# Patient Record
Sex: FEMALE | Race: ASIAN | NOT HISPANIC OR LATINO | Employment: UNEMPLOYED | URBAN - METROPOLITAN AREA
[De-identification: names, ages, dates, MRNs, and addresses within clinical notes are randomized per-mention and may not be internally consistent; named-entity substitution may affect disease eponyms.]

---

## 2017-08-18 ENCOUNTER — OFFICE VISIT (OUTPATIENT)
Dept: PEDIATRICS | Facility: CLINIC | Age: 47
End: 2017-08-18
Payer: COMMERCIAL

## 2017-08-18 VITALS
OXYGEN SATURATION: 99 % | HEART RATE: 64 BPM | HEIGHT: 65 IN | DIASTOLIC BLOOD PRESSURE: 74 MMHG | WEIGHT: 150 LBS | SYSTOLIC BLOOD PRESSURE: 116 MMHG | BODY MASS INDEX: 24.99 KG/M2 | TEMPERATURE: 98.6 F

## 2017-08-18 DIAGNOSIS — Z83.3 FAMILY HISTORY OF DIABETES MELLITUS: ICD-10-CM

## 2017-08-18 DIAGNOSIS — R00.2 PALPITATIONS: ICD-10-CM

## 2017-08-18 DIAGNOSIS — Z00.00 ROUTINE GENERAL MEDICAL EXAMINATION AT A HEALTH CARE FACILITY: Primary | ICD-10-CM

## 2017-08-18 DIAGNOSIS — Z13.6 CARDIOVASCULAR SCREENING; LDL GOAL LESS THAN 160: ICD-10-CM

## 2017-08-18 DIAGNOSIS — E55.9 VITAMIN D DEFICIENCY: ICD-10-CM

## 2017-08-18 LAB
ANION GAP SERPL CALCULATED.3IONS-SCNC: 8 MMOL/L (ref 3–14)
BUN SERPL-MCNC: 8 MG/DL (ref 7–30)
CALCIUM SERPL-MCNC: 9 MG/DL (ref 8.5–10.1)
CHLORIDE SERPL-SCNC: 107 MMOL/L (ref 94–109)
CHOLEST SERPL-MCNC: 152 MG/DL
CO2 SERPL-SCNC: 25 MMOL/L (ref 20–32)
CREAT SERPL-MCNC: 0.65 MG/DL (ref 0.52–1.04)
ERYTHROCYTE [DISTWIDTH] IN BLOOD BY AUTOMATED COUNT: 12.9 % (ref 10–15)
GFR SERPL CREATININE-BSD FRML MDRD: >90 ML/MIN/1.7M2
GLUCOSE SERPL-MCNC: 89 MG/DL (ref 70–99)
HCT VFR BLD AUTO: 38.7 % (ref 35–47)
HDLC SERPL-MCNC: 38 MG/DL
HGB BLD-MCNC: 12.7 G/DL (ref 11.7–15.7)
LDLC SERPL CALC-MCNC: 86 MG/DL
MCH RBC QN AUTO: 28.9 PG (ref 26.5–33)
MCHC RBC AUTO-ENTMCNC: 32.8 G/DL (ref 31.5–36.5)
MCV RBC AUTO: 88 FL (ref 78–100)
NONHDLC SERPL-MCNC: 114 MG/DL
PLATELET # BLD AUTO: 189 10E9/L (ref 150–450)
POTASSIUM SERPL-SCNC: 4.3 MMOL/L (ref 3.4–5.3)
RBC # BLD AUTO: 4.4 10E12/L (ref 3.8–5.2)
SODIUM SERPL-SCNC: 140 MMOL/L (ref 133–144)
TRIGL SERPL-MCNC: 138 MG/DL
TSH SERPL DL<=0.005 MIU/L-ACNC: 2.38 MU/L (ref 0.4–4)
WBC # BLD AUTO: 6.6 10E9/L (ref 4–11)

## 2017-08-18 PROCEDURE — 80061 LIPID PANEL: CPT | Performed by: INTERNAL MEDICINE

## 2017-08-18 PROCEDURE — 85027 COMPLETE CBC AUTOMATED: CPT | Performed by: INTERNAL MEDICINE

## 2017-08-18 PROCEDURE — 84443 ASSAY THYROID STIM HORMONE: CPT | Performed by: INTERNAL MEDICINE

## 2017-08-18 PROCEDURE — 99396 PREV VISIT EST AGE 40-64: CPT | Performed by: INTERNAL MEDICINE

## 2017-08-18 PROCEDURE — 36415 COLL VENOUS BLD VENIPUNCTURE: CPT | Performed by: INTERNAL MEDICINE

## 2017-08-18 PROCEDURE — 80048 BASIC METABOLIC PNL TOTAL CA: CPT | Performed by: INTERNAL MEDICINE

## 2017-08-18 PROCEDURE — 82306 VITAMIN D 25 HYDROXY: CPT | Performed by: INTERNAL MEDICINE

## 2017-08-18 PROCEDURE — 99213 OFFICE O/P EST LOW 20 MIN: CPT | Mod: 25 | Performed by: INTERNAL MEDICINE

## 2017-08-18 NOTE — PATIENT INSTRUCTIONS
Preventive Health Recommendations  Female Ages 40 to 49    Yearly exam:     See your health care provider every year in order to  1. Review health changes.   2. Discuss preventive care.    3. Review your medicines if your doctor prescribed any.        If you get Pap tests with HPV test, you only need to test every 5 years, unless you have an abnormal result. You are due in 2021      Ask your doctor if you should have a mammogram.      Have a colonoscopy (test for colon cancer) if someone in your family has had colon cancer or polyps before age 50.       Have a cholesterol test every 5 years.       Have a diabetes test (fasting glucose) after age 45. If you are at risk for diabetes, you should have this test every 3 years.    You can consider a mirena for contraception: [email or web address]/about-mirena/.  You can schedule with one of our nurse practitioners (Jasmin Pineda or Neisha Alejandre) if you are interested.    Shots: Get a flu shot each year. Get a tetanus shot every 10 years.     Nutrition:     Eat at least 5 servings of fruits and vegetables each day.    Eat whole-grain bread, whole-wheat pasta and brown rice instead of white grains and rice.    Talk to your provider about Calcium and Vitamin D.     Lifestyle    Exercise at least 150 minutes a week (an average of 30 minutes a day, 5 days a week). This will help you control your weight and prevent disease.    Limit alcohol to one drink per day.    No smoking.     Wear sunscreen to prevent skin cancer.    See your dentist every six months for an exam and cleaning.

## 2017-08-18 NOTE — NURSING NOTE
"Chief Complaint   Patient presents with     Physical       Initial /74 (BP Location: Right arm, Patient Position: Right side, Cuff Size: Adult Regular)  Pulse 64  Temp 98.6  F (37  C) (Tympanic)  Ht 5' 5\" (1.651 m)  Wt 150 lb (68 kg)  SpO2 99%  BMI 24.96 kg/m2 Estimated body mass index is 24.96 kg/(m^2) as calculated from the following:    Height as of this encounter: 5' 5\" (1.651 m).    Weight as of this encounter: 150 lb (68 kg).  Medication Reconciliation: complete  "

## 2017-08-18 NOTE — PROGRESS NOTES
SUBJECTIVE:   CC: Carie Hi is an 46 year old woman who presents for preventive health visit.     Physical   Annual:     Getting at least 3 servings of Calcium per day::  Yes    Bi-annual eye exam::  Yes    Dental care twice a year::  Yes    Sleep apnea or symptoms of sleep apnea::  None    Diet::  Vegetarian/vegan    Frequency of exercise::  2-3 days/week    Duration of exercise::  45-60 minutes    Taking medications regularly::  Not Applicable    Additional concerns today::  YES (palpitations before bed )    Exercise - goes to [facility] 3-4 x/wk with elliptical, stepper and weights.  Not as much in summer, more walks and biking.    Palpitations - only notes when lying down in bed.  Not with exercise and no lightheadedness.  Not sure about exercise.  Has been worse this summer since not exercising.  Had normal EKG in 2016.      Today's PHQ-2 Score:   PHQ-2 ( 1999 [employer]izer) 8/15/2017   Q1: Little interest or pleasure in doing things 0   Q2: Feeling down, depressed or hopeless 0   PHQ-2 Score 0   Q1: Little interest or pleasure in doing things Not at all   Q2: Feeling down, depressed or hopeless Not at all   PHQ-2 Score 0       Abuse: Current or Past(Physical, Sexual or Emotional)- No  Do you feel safe in your environment - Yes    Social History   Substance Use Topics     Smoking status: Never Smoker     Smokeless tobacco: Never Used     Alcohol use No     The patient does not drink >3 drinks per day nor >7 drinks per week.    Reviewed orders with patient.  Reviewed health maintenance and updated orders accordingly - Yes  Labs reviewed in EPIC    Patient under age 50, mutual decision reflected in health maintenance.        Pertinent mammograms are reviewed under the imaging tab.  History of abnormal Pap smear: NO - age 30-65 PAP every 5 years with negative HPV co-testing recommended    Reviewed and updated as needed this visit by clinical staffTobacco  Allergies  Meds  Problems  Med Hx  Surg Hx  Fam Hx  " Soc Hx          Reviewed and updated as needed this visit by Provider  Tobacco  Allergies  Meds  Problems  Fam Hx  Soc Hx           ROS:  C: NEGATIVE for fever, chills, change in weight  I: NEGATIVE for worrisome rashes, moles or lesions  E: NEGATIVE for vision changes or irritation  ENT: NEGATIVE for ear, mouth and throat problems  R: NEGATIVE for significant cough or SOB  B: NEGATIVE for masses, tenderness or discharge  CV: NEGATIVE for chest pain, palpitations or peripheral edema  GI: NEGATIVE for nausea, abdominal pain, heartburn, or change in bowel habits  : NEGATIVE for unusual urinary or vaginal symptoms. Periods are regular, no heavy bleeding.  M: NEGATIVE for significant arthralgias or myalgia  N: NEGATIVE for weakness, dizziness or paresthesias  P: NEGATIVE for changes in mood or affect     OBJECTIVE:   /74 (BP Location: Right arm, Patient Position: Right side, Cuff Size: Adult Regular)  Pulse 64  Temp 98.6  F (37  C) (Tympanic)  Ht 5' 5\" (1.651 m)  Wt 150 lb (68 kg)  SpO2 99%  BMI 24.96 kg/m2  EXAM:  GENERAL: healthy, alert and no distress  EYES: Eyes grossly normal to inspection, PERRL and conjunctivae and sclerae normal  HENT: ear canals and TM's normal, nose and mouth without ulcers or lesions  NECK: no adenopathy, no asymmetry, masses, or scars and thyroid normal to palpation  RESP: lungs clear to auscultation - no rales, rhonchi or wheezes  BREAST: normal without masses, tenderness or nipple discharge and no palpable axillary masses or adenopathy  CV: regular rate and rhythm, normal S1 S2, no S3 or S4, no murmur, click or rub, no peripheral edema and peripheral pulses strong  ABDOMEN: soft, nontender, no hepatosplenomegaly, no masses and bowel sounds normal  MS: no gross musculoskeletal defects noted, no edema  SKIN: no suspicious lesions or rashes  NEURO: Normal strength and tone, mentation intact and speech normal  PSYCH: mentation appears normal, affect " "normal/bright    ASSESSMENT/PLAN:   1. Routine general medical examination at a health care facility  Routine health education discussed: calcium, diet, exercise, weight, safety.     2. CARDIOVASCULAR SCREENING; LDL GOAL LESS THAN 160   - Lipid panel reflex to direct LDL    3. Family history of diabetes mellitus    - Glucose    4. Vitamin D deficiency    - Vitamin D Deficiency      5. Palpitations  Will screen for metabolic cause with labs per orders. Consider holter based on results.  Increase exercise encouraged  - CBC with platelets  - TSH with free T4 reflex  - Basic metabolic panel     COUNSELING:  Reviewed preventive health counseling, as reflected in patient instructions         reports that she has never smoked. She has never used smokeless tobacco.    Estimated body mass index is 24.96 kg/(m^2) as calculated from the following:    Height as of this encounter: 5' 5\" (1.651 m).    Weight as of this encounter: 150 lb (68 kg).         Counseling Resources:  ATP IV Guidelines  Pooled Cohorts Equation Calculator  Breast Cancer Risk Calculator  FRAX Risk Assessment  ICSI Preventive Guidelines  Dietary Guidelines for Americans, 2010  USDA's MyPlate  ASA Prophylaxis  Lung CA Screening    Sun Breen MD  [facility] [place]  "

## 2017-08-18 NOTE — MR AVS SNAPSHOT
After Visit Summary   8/18/2017    Carie Hi    MRN: [id number]           Patient Information     Date Of Birth          1970        Visit Information        Provider Department      8/18/2017 8:50 AM Sun Breen MD [facility] [place]        Today's Diagnoses     Routine general medical examination at a health care facility    -  1    CARDIOVASCULAR SCREENING; LDL GOAL LESS THAN 160        Family history of diabetes mellitus        Vitamin D deficiency        Palpitations          Care Instructions      Preventive Health Recommendations  Female Ages 40 to 49    Yearly exam:     See your health care provider every year in order to  1. Review health changes.   2. Discuss preventive care.    3. Review your medicines if your doctor prescribed any.        If you get Pap tests with HPV test, you only need to test every 5 years, unless you have an abnormal result. You are due in 2021      Ask your doctor if you should have a mammogram.      Have a colonoscopy (test for colon cancer) if someone in your family has had colon cancer or polyps before age 50.       Have a cholesterol test every 5 years.       Have a diabetes test (fasting glucose) after age 45. If you are at risk for diabetes, you should have this test every 3 years.    You can consider a mirena for contraception: [email or web address]/about-[email or web address]  You can schedule with one of our nurse practitioners (Jasmin Pineda or Neisha Alejandre) if you are interested.    Shots: Get a flu shot each year. Get a tetanus shot every 10 years.     Nutrition:     Eat at least 5 servings of fruits and vegetables each day.    Eat whole-grain bread, whole-wheat pasta and brown rice instead of white grains and rice.    Talk to your provider about Calcium and Vitamin D.     Lifestyle    Exercise at least 150 minutes a week (an average of 30 minutes a day, 5 days a week). This will help you control your weight and prevent disease.    Limit  "alcohol to one drink per day.    No smoking.     Wear sunscreen to prevent skin cancer.    See your dentist every six months for an exam and cleaning.          Follow-ups after your visit        Who to contact     If you have questions or need follow up information about today's clinic visit or your schedule please contact [facility] directly at [phone].  Normal or non-critical lab and imaging results will be communicated to you by MyChart, letter or phone within 4 business days after the clinic has received the results. If you do not hear from us within 7 days, please contact the clinic through [employer] or phone. If you have a critical or abnormal lab result, we will notify you by phone as soon as possible.  Submit refill requests through [employer] or call your pharmacy and they will forward the refill request to us. Please allow 3 business days for your refill to be completed.          Additional Information About Your Visit        [employer] Information     [employer] gives you secure access to your electronic health record. If you see a primary care provider, you can also send messages to your care team and make appointments. If you have questions, please call your primary care clinic.  If you do not have a primary care provider, please call [phone] and they will assist you.        Care EveryWhere ID     This is your Care EveryWhere ID. This could be used by other organizations to access your [place] medical records  [id number]        Your Vitals Were     Pulse Temperature Height Pulse Oximetry BMI (Body Mass Index)       64 98.6  F (37  C) (Tympanic) 5' 5\" (1.651 m) 99% 24.96 kg/m2        Blood Pressure from Last 3 Encounters:   08/18/17 116/74   02/16/16 116/80   02/06/16 (!) 146/92    Weight from Last 3 Encounters:   08/18/17 150 lb (68 kg)   02/16/16 144 lb (65.3 kg)   02/06/16 143 lb 8 oz (65.1 kg)              We Performed the Following     Basic metabolic panel     CBC with platelets     " Lipid panel reflex to direct LDL     TSH with free T4 reflex     Vitamin D Deficiency        Primary Care Provider Office Phone # Fax #    Sun Breen [phone]       [facility] [place] [address]        Equal Access to Services     CEDRICK MACK : Hadii colleen ortiz memoo Soestefaniaali, waaxda luqadaha, qaybta kaalmada adequinn, dominic ryderin hayaadheeraj posadaadeline ohara curt arizmendi. So [facility] [phone].    ATENCIÓN: Si habla español, tiene a hahn disposición servicios gratuitos de asistencia lingüística. Llame al [phone].    We comply with applicable federal civil rights laws and [place] laws. We do not discriminate on the basis of race, color, national origin, age, disability sex, sexual orientation or gender identity.            Thank you!     Thank you for choosing [facility]  for your care. Our goal is always to provide you with excellent care. Hearing back from our patients is one way we can continue to improve our services. Please take a few minutes to complete the written survey that you may receive in the mail after your visit with us. Thank you!             Your Updated Medication List - Protect others around you: Learn how to safely use, store and throw away your medicines at www.disposemymeds.org.          This list is accurate as of: 8/18/17  9:29 AM.  Always use your most recent med list.                   Brand Name Dispense Instructions for use Diagnosis    calcium 600 MG tablet     100 tablet    Take 1 tablet by mouth daily.        vitamin D 2000 UNITS tablet     100 tablet    Take 2.5 tablets by mouth daily.

## 2017-08-19 LAB — DEPRECATED CALCIDIOL+CALCIFEROL SERPL-MC: 25 UG/L (ref 20–75)

## 2018-11-09 ENCOUNTER — ALLIED HEALTH/NURSE VISIT (OUTPATIENT)
Dept: NURSING | Facility: CLINIC | Age: 48
End: 2018-11-09
Payer: COMMERCIAL

## 2018-11-09 DIAGNOSIS — Z23 NEED FOR PROPHYLACTIC VACCINATION AND INOCULATION AGAINST INFLUENZA: Primary | ICD-10-CM

## 2018-11-09 PROCEDURE — 99207 ZZC NO CHARGE NURSE ONLY: CPT

## 2018-11-09 PROCEDURE — 90471 IMMUNIZATION ADMIN: CPT

## 2018-11-09 PROCEDURE — 90686 IIV4 VACC NO PRSV 0.5 ML IM: CPT

## 2018-11-09 NOTE — MR AVS SNAPSHOT
After Visit Summary   11/9/2018    Carie Hi    MRN: [id number]           Patient Information     Date Of Birth          1970        Visit Information        Provider Department      11/9/2018 4:00 PM ZONIA NURSE AB [facility]        Today's Diagnoses     Need for prophylactic vaccination and inoculation against influenza    -  1       Follow-ups after your visit        Who to contact     If you have questions or need follow up information about today's clinic visit or your schedule please contact [facility] directly at [phone].  Normal or non-critical lab and imaging results will be communicated to you by FlowCohart, letter or phone within 4 business days after the clinic has received the results. If you do not hear from us within 7 days, please contact the clinic through [employer] or phone. If you have a critical or abnormal lab result, we will notify you by phone as soon as possible.  Submit refill requests through [employer] or call your pharmacy and they will forward the refill request to us. Please allow 3 business days for your refill to be completed.          Additional Information About Your Visit        MyChart Information     [employer] gives you secure access to your electronic health record. If you see a primary care provider, you can also send messages to your care team and make appointments. If you have questions, please call your primary care clinic.  If you do not have a primary care provider, please call [phone] and they will assist you.        Care EveryWhere ID     This is your Care EveryWhere ID. This could be used by other organizations to access your [place] medical records  [id number]         Blood Pressure from Last 3 Encounters:   08/18/17 116/74   02/16/16 116/80   02/06/16 (!) 146/92    Weight from Last 3 Encounters:   08/18/17 150 lb (68 kg)   02/16/16 144 lb (65.3 kg)   02/06/16 143 lb 8 oz (65.1 kg)              We Performed the  Following     FLU VACCINE, SPLIT VIRUS, IM (QUADRIVALENT) [27321]- >3 YRS     Vaccine Administration, Initial [24758]        Primary Care Provider Office Phone # Fax #    Sun Breen [phone] [address] [place] [address]        Equal Access to Services     [facility]: Hadii aad ku hadasho Soomaali, waaxda luqadaha, qaybta kaalmada adeegyada, waxhuyen mckinnonjessicaotilio arizmendi. So [facility] [phone].    ATENCIÓN: Si habla español, tiene a hahn disposición servicios gratuitos de asistencia lingüística. [facility] [phone].    We comply with applicable federal civil rights laws and [place] laws. We do not discriminate on the basis of race, color, national origin, age, disability, sex, sexual orientation, or gender identity.            Thank you!     [facility]. Our goal is always to provide you with excellent care. Hearing back from our patients is one way we can continue to improve our services. Please take a few minutes to complete the written survey that you may receive in the mail after your visit with us. Thank you!             Your Updated Medication List - Protect others around you: Learn how to safely use, store and throw away your medicines at www.disposemymeds.org.          This list is accurate as of 11/9/18  4:11 PM.  Always use your most recent med list.                   Brand Name Dispense Instructions for use Diagnosis    calcium 600 MG tablet     100 tablet    Take 1 tablet by mouth daily.        vitamin D 2000 units tablet     100 tablet    Take 2.5 tablets by mouth daily.

## 2019-11-05 ENCOUNTER — HEALTH MAINTENANCE LETTER (OUTPATIENT)
Age: 49
End: 2019-11-05

## 2019-11-06 ENCOUNTER — OFFICE VISIT (OUTPATIENT)
Dept: PEDIATRICS | Facility: CLINIC | Age: 49
End: 2019-11-06
Payer: COMMERCIAL

## 2019-11-06 VITALS
SYSTOLIC BLOOD PRESSURE: 126 MMHG | OXYGEN SATURATION: 98 % | HEIGHT: 65 IN | BODY MASS INDEX: 25.66 KG/M2 | RESPIRATION RATE: 16 BRPM | DIASTOLIC BLOOD PRESSURE: 70 MMHG | WEIGHT: 154 LBS | TEMPERATURE: 98.5 F | HEART RATE: 61 BPM

## 2019-11-06 DIAGNOSIS — Z00.00 ROUTINE GENERAL MEDICAL EXAMINATION AT A HEALTH CARE FACILITY: Primary | ICD-10-CM

## 2019-11-06 PROCEDURE — 90686 IIV4 VACC NO PRSV 0.5 ML IM: CPT | Performed by: PEDIATRICS

## 2019-11-06 PROCEDURE — 90471 IMMUNIZATION ADMIN: CPT | Performed by: PEDIATRICS

## 2019-11-06 PROCEDURE — 99396 PREV VISIT EST AGE 40-64: CPT | Mod: 25 | Performed by: PEDIATRICS

## 2019-11-06 ASSESSMENT — ENCOUNTER SYMPTOMS
COUGH: 0
HEMATOCHEZIA: 0
FEVER: 0
NERVOUS/ANXIOUS: 0
DIARRHEA: 0
HEMATURIA: 0
EYE PAIN: 0
DIZZINESS: 0
ABDOMINAL PAIN: 0
CONSTIPATION: 0
CHILLS: 0

## 2019-11-06 ASSESSMENT — MIFFLIN-ST. JEOR: SCORE: 1329.42

## 2019-11-06 NOTE — PROGRESS NOTES
SUBJECTIVE:   CC: Carie Hi is an 49 year old woman who presents for preventive health visit.     Healthy Habits:     Getting at least 3 servings of Calcium per day:  Yes    Bi-annual eye exam:  Yes    Dental care twice a year:  Yes    Sleep apnea or symptoms of sleep apnea:  None    Diet:  Regular (no restrictions)    Frequency of exercise:  4-5 days/week    Duration of exercise:  45-60 minutes    Taking medications regularly:  Not Applicable    Medication side effects:  Not applicable    PHQ-2 Total Score: 0    Additional concerns today:  No    Patient is a vegan - wondering about taking MVI.  Does not want any lab test that would not be covered by insurance.     Today's PHQ-2 Score:   PHQ-2 ( 1999 Pfizer) 11/6/2019   Q1: Little interest or pleasure in doing things 0   Q2: Feeling down, depressed or hopeless 0   PHQ-2 Score 0   Q1: Little interest or pleasure in doing things Not at all   Q2: Feeling down, depressed or hopeless Not at all   PHQ-2 Score 0       Abuse: Current or Past(Physical, Sexual or Emotional)- No  Do you feel safe in your environment? Yes        Social History     Tobacco Use     Smoking status: Never Smoker     Smokeless tobacco: Never Used   Substance Use Topics     Alcohol use: No     If you drink alcohol do you typically have >3 drinks per day or >7 drinks per week? No    Alcohol Use 11/6/2019   Prescreen: >3 drinks/day or >7 drinks/week? No   Prescreen: >3 drinks/day or >7 drinks/week? -       Reviewed orders with patient.  Reviewed health maintenance and updated orders accordingly - Yes    Pertinent mammograms are reviewed under the imaging tab.  History of abnormal Pap smear: NO - age 30-65 PAP every 5 years with negative HPV co-testing recommended  PAP / HPV Latest Ref Rng & Units 2/16/2016 4/15/2011 4/25/2008   PAP - NIL NIL NIL   HPV 16 DNA NEG Negative - -   HPV 18 DNA NEG Negative - -   OTHER HR HPV NEG Negative - -     Reviewed and updated as needed this visit by  "clinical staff  Tobacco  Allergies  Meds  Med Hx  Surg Hx  Fam Hx  Soc Hx        Reviewed and updated as needed this visit by Provider  Meds  Fam Hx            Review of Systems   Constitutional: Negative for chills and fever.   HENT: Negative for congestion and ear pain.    Eyes: Negative for pain.   Respiratory: Negative for cough.    Cardiovascular: Negative for chest pain.   Gastrointestinal: Negative for abdominal pain, constipation, diarrhea and hematochezia.   Genitourinary: Negative for hematuria.   Neurological: Negative for dizziness.   Psychiatric/Behavioral: The patient is not nervous/anxious.           OBJECTIVE:   /70 (BP Location: Right arm, Cuff Size: Adult Large)   Pulse 61   Temp 98.5  F (36.9  C) (Oral)   Resp 16   Ht 1.651 m (5' 5\")   Wt 69.9 kg (154 lb)   SpO2 98%   BMI 25.63 kg/m    Physical Exam  GENERAL: healthy, alert and no distress  EYES: Eyes grossly normal to inspection, PERRL and conjunctivae and sclerae normal  HENT: ear canals and TM's normal, nose and mouth without ulcers or lesions  NECK: no adenopathy, no asymmetry, masses, or scars and thyroid normal to palpation  RESP: lungs clear to auscultation - no rales, rhonchi or wheezes  BREAST: normal without masses, tenderness or nipple discharge and no palpable axillary masses or adenopathy  CV: regular rate and rhythm, normal S1 S2, no S3 or S4, no murmur, click or rub, no peripheral edema and peripheral pulses strong  ABDOMEN: soft, nontender, no hepatosplenomegaly, no masses and bowel sounds normal  MS: no gross musculoskeletal defects noted, no edema  SKIN: no suspicious lesions or rashes  NEURO: Normal strength and tone, mentation intact and speech normal  PSYCH: mentation appears normal, affect normal/bright    Diagnostic Test Results:  Labs reviewed in Epic    ASSESSMENT/PLAN:   1. Routine general medical examination at a health care facility  Flu shot today  Plan to recheck fasting labs next year (3 y " "georgiana)      COUNSELING:  Reviewed preventive health counseling, as reflected in patient instructions    Estimated body mass index is 25.63 kg/m  as calculated from the following:    Height as of this encounter: 1.651 m (5' 5\").    Weight as of this encounter: 69.9 kg (154 lb).         reports that she has never smoked. She has never used smokeless tobacco.      Counseling Resources:  ATP IV Guidelines  Pooled Cohorts Equation Calculator  Breast Cancer Risk Calculator  FRAX Risk Assessment  ICSI Preventive Guidelines  Dietary Guidelines for Americans, 2010  USDA's MyPlate  ASA Prophylaxis  Lung CA Screening    Neisha Miguel MD  [facility]  "

## 2019-11-06 NOTE — PATIENT INSTRUCTIONS
For vitamin - RAW ONE for women  Or Nature made if another good one    Preventive Health Recommendations  Female Ages 40 to 49    Yearly exam:     See your health care provider every year in order to  1. Review health changes.   2. Discuss preventive care.    3. Review your medicines if your doctor prescribed any.      Get a Pap test every three years (unless you have an abnormal result and your provider advises testing more often).      If you get Pap tests with HPV test, you only need to test every 5 years, unless you have an abnormal result. You do not need a Pap test if your uterus was removed (hysterectomy) and you have not had cancer.      You should be tested each year for STDs (sexually transmitted diseases), if you're at risk.     Ask your doctor if you should have a mammogram.      Have a colonoscopy (test for colon cancer) if someone in your family has had colon cancer or polyps before age 50.       Have a cholesterol test every 5 years.       Have a diabetes test (fasting glucose) after age 45. If you are at risk for diabetes, you should have this test every 3 years.    Shots: Get a flu shot each year. Get a tetanus shot every 10 years.     Nutrition:     Eat at least 5 servings of fruits and vegetables each day.    Eat whole-grain bread, whole-wheat pasta and brown rice instead of white grains and rice.    Get adequate Calcium and Vitamin D.      Lifestyle    Exercise at least 150 minutes a week (an average of 30 minutes a day, 5 days a week). This will help you control your weight and prevent disease.    Limit alcohol to one drink per day.    No smoking.     Wear sunscreen to prevent skin cancer.    See your dentist every six months for an exam and cleaning.

## 2019-12-03 ENCOUNTER — TRANSFERRED RECORDS (OUTPATIENT)
Dept: HEALTH INFORMATION MANAGEMENT | Facility: CLINIC | Age: 49
End: 2019-12-03

## 2020-03-28 ENCOUNTER — NURSE TRIAGE (OUTPATIENT)
Dept: NURSING | Facility: CLINIC | Age: 50
End: 2020-03-28

## 2020-03-29 NOTE — TELEPHONE ENCOUNTER
"Pain in back above the waist. Pain varies in intensity. Began last night. It goes from the back to front, mostly in back. Not on the side. Currently pain =\"7\". It has been ranging between \"6-7.5\". Constant x 24 hrs. She took antacid this evening, hoping that if she burped or passed gas or passed a stool to see if that would help--but it hasn't. No pain in the chest. No cough.   No fever noted. Loose stool x1 this am, after a regular BM. No bloating noted.   She has not taken anything OTC for the pain yet, she has Ibuprofen and Tylenol.  She ate [employer] pizza last night 9pm  and the pain began 12:30am after eating it.  and child ate the same food without becoming ill.   No nausea or vomiting.   No known coronavirus exposure.     Triaged to a disposition of Home Care. She intends to take Tylenol now. If pain lasts >3 days has been instructed to contact her Dr. If sx worsen or change, she will call back.     Additional Information    Negative: Passed out (i.e., lost consciousness, collapsed and was not responding)    Negative: Shock suspected (e.g., cold/pale/clammy skin, too weak to stand, low BP, rapid pulse)    Negative: Sounds like a life-threatening emergency to the triager    Negative: Major injury to the back (e.g., MVA, fall > 10 feet or 3 meters, penetrating injury, etc.)    Negative: Followed a tailbone injury    Negative: [1] Pain in the upper back over the ribs (rib cage) AND [2] radiates (travels, goes) into chest    Negative: [1] Pain in the upper back over the ribs (rib cage) AND [2] worsened by coughing (or clearly increases with breathing)    Negative: Back pain during pregnancy    Negative: Pain mainly in flank (i.e., in the side, over the lower ribs or just below the ribs)    Negative: [1] SEVERE back pain (e.g., excruciating) AND [2] sudden onset AND [3] age > 60    Negative: [1] Unable to urinate (or only a few drops) > 4 hours AND     [2] bladder feels very full (e.g., palpable bladder or " "strong urge to urinate)    Negative: Numbness in groin or rectal area (i.e., loss of sensation)    Negative: [1] Urinary or bowel incontinence (i.e., loss of bladder or bowel control) AND [2] new onset    Negative: [1] SEVERE abdominal pain AND [2] present > 1 hour    Negative: [1] Abdominal pain AND [2] age > 60    Negative: Weakness of a leg or foot (e.g., unable to bear weight, dragging foot)    Negative: Unable to walk    Negative: Patient sounds very sick or weak to the triager    Negative: [1] SEVERE back pain (e.g., excruciating, unable to do any normal activities) AND [2] not improved 2 hours after pain medicine    Negative: [1] Pain radiates into the thigh or further down the leg AND [2] both legs    Negative: [1] Fever > 100.0 F (37.8 C) AND [2] flank pain (i.e., in side, below ribs and above hip)    Negative: [1] Pain or burning with urination AND [2] flank pain (i.e., in side, below ribs and above hip)    Negative: Numbness in a leg or foot (i.e., loss of sensation)    Negative: [1] Upper back pain AND [2] numbness in a arm or hand (i.e., loss of sensation)    Negative: High-risk adult (e.g., history of cancer, HIV, or IV drug abuse)    Negative: [1] Fever AND [2] no symptoms of UTI  (Exception: has generalized muscle pains, not localized back pain)    Negative: Rash in same area as pain (may be described as \"small blisters\")    Negative: Blood in urine (red, pink, or tea-colored)    Negative: [1] MODERATE back pain (e.g., interferes with normal activities) AND [2] present > 3 days    Negative: [1] Pain radiates into the thigh or further down the leg AND [2] one leg    Negative: [1] Age > 50 AND [2] no history of prior similar back pain    Negative: Back pain present > 2 weeks    Negative: Back pain is a chronic symptom (recurrent or ongoing AND present > 4 weeks)    Negative: Caused by a twisting, bending, or lifting injury    Negative: Caused by overuse from recent vigorous activity (e.g., exercise, " gardening, lifting and carrying, sports)    Back pain    Protocols used: BACK PAIN-A-AH

## 2020-03-30 ENCOUNTER — TRANSFERRED RECORDS (OUTPATIENT)
Dept: HEALTH INFORMATION MANAGEMENT | Facility: CLINIC | Age: 50
End: 2020-03-30

## 2020-03-30 ENCOUNTER — NURSE TRIAGE (OUTPATIENT)
Dept: PEDIATRICS | Facility: CLINIC | Age: 50
End: 2020-03-30

## 2020-03-30 ENCOUNTER — HOSPITAL ENCOUNTER (OUTPATIENT)
Facility: CLINIC | Age: 50
Setting detail: OBSERVATION
Discharge: HOME | End: 2020-03-31
Attending: HOSPITALIST | Admitting: INTERNAL MEDICINE
Payer: COMMERCIAL

## 2020-03-30 DIAGNOSIS — K81.0 ACUTE CHOLECYSTITIS: Primary | ICD-10-CM

## 2020-03-30 LAB
ALT SERPL-CCNC: 19 U/L
AST SERPL-CCNC: 24 IU/L (ref 15–46)
CREAT SERPL-MCNC: 0.5 MG/DL (ref 0.52–1.04)
GFR SERPL CREATININE-BSD FRML MDRD: >60 ML/MIN/1.73M2
GLUCOSE SERPL-MCNC: 125 MG/DL (ref 74–106)
POTASSIUM SERPL-SCNC: 3.8 MMOL/L (ref 3.5–5.1)

## 2020-03-30 PROCEDURE — G0379 DIRECT REFER HOSPITAL OBSERV: HCPCS

## 2020-03-30 PROCEDURE — 25000128 H RX IP 250 OP 636: Performed by: HOSPITALIST

## 2020-03-30 PROCEDURE — 25000132 ZZH RX MED GY IP 250 OP 250 PS 637: Performed by: INTERNAL MEDICINE

## 2020-03-30 PROCEDURE — 99219 ZZC INITIAL OBSERVATION CARE,LEVL II: CPT | Performed by: INTERNAL MEDICINE

## 2020-03-30 PROCEDURE — 96374 THER/PROPH/DIAG INJ IV PUSH: CPT

## 2020-03-30 PROCEDURE — G0378 HOSPITAL OBSERVATION PER HR: HCPCS

## 2020-03-30 RX ORDER — ACETAMINOPHEN 650 MG/1
650 SUPPOSITORY RECTAL EVERY 4 HOURS PRN
Status: DISCONTINUED | OUTPATIENT
Start: 2020-03-30 | End: 2020-03-31 | Stop reason: HOSPADM

## 2020-03-30 RX ORDER — OXYCODONE HYDROCHLORIDE 5 MG/1
5-10 TABLET ORAL
Status: DISCONTINUED | OUTPATIENT
Start: 2020-03-30 | End: 2020-03-31 | Stop reason: HOSPADM

## 2020-03-30 RX ORDER — NALOXONE HYDROCHLORIDE 0.4 MG/ML
.1-.4 INJECTION, SOLUTION INTRAMUSCULAR; INTRAVENOUS; SUBCUTANEOUS
Status: DISCONTINUED | OUTPATIENT
Start: 2020-03-30 | End: 2020-03-31 | Stop reason: HOSPADM

## 2020-03-30 RX ORDER — AMOXICILLIN 250 MG
2 CAPSULE ORAL 2 TIMES DAILY
Status: DISCONTINUED | OUTPATIENT
Start: 2020-03-30 | End: 2020-03-31 | Stop reason: HOSPADM

## 2020-03-30 RX ORDER — BISACODYL 5 MG
5 TABLET, DELAYED RELEASE (ENTERIC COATED) ORAL DAILY PRN
Status: DISCONTINUED | OUTPATIENT
Start: 2020-03-30 | End: 2020-03-31 | Stop reason: HOSPADM

## 2020-03-30 RX ORDER — CEFTRIAXONE 1 G/1
1 INJECTION, POWDER, FOR SOLUTION INTRAMUSCULAR; INTRAVENOUS ONCE
Status: COMPLETED | OUTPATIENT
Start: 2020-03-30 | End: 2020-03-31

## 2020-03-30 RX ORDER — ACETAMINOPHEN 325 MG/1
650 TABLET ORAL EVERY 4 HOURS PRN
Status: DISCONTINUED | OUTPATIENT
Start: 2020-03-30 | End: 2020-03-31 | Stop reason: HOSPADM

## 2020-03-30 RX ORDER — PROCHLORPERAZINE MALEATE 10 MG
10 TABLET ORAL EVERY 6 HOURS PRN
Status: DISCONTINUED | OUTPATIENT
Start: 2020-03-30 | End: 2020-03-31 | Stop reason: HOSPADM

## 2020-03-30 RX ORDER — BISACODYL 5 MG
15 TABLET, DELAYED RELEASE (ENTERIC COATED) ORAL DAILY PRN
Status: DISCONTINUED | OUTPATIENT
Start: 2020-03-30 | End: 2020-03-31 | Stop reason: HOSPADM

## 2020-03-30 RX ORDER — AMOXICILLIN 250 MG
1 CAPSULE ORAL 2 TIMES DAILY
Status: DISCONTINUED | OUTPATIENT
Start: 2020-03-30 | End: 2020-03-31 | Stop reason: HOSPADM

## 2020-03-30 RX ORDER — POLYETHYLENE GLYCOL 3350 17 G/17G
17 POWDER, FOR SOLUTION ORAL DAILY PRN
Status: DISCONTINUED | OUTPATIENT
Start: 2020-03-30 | End: 2020-03-31 | Stop reason: HOSPADM

## 2020-03-30 RX ORDER — BISACODYL 10 MG
10 SUPPOSITORY, RECTAL RECTAL DAILY PRN
Status: DISCONTINUED | OUTPATIENT
Start: 2020-03-30 | End: 2020-03-31 | Stop reason: HOSPADM

## 2020-03-30 RX ORDER — CEFTRIAXONE 2 G/1
2 INJECTION, POWDER, FOR SOLUTION INTRAMUSCULAR; INTRAVENOUS EVERY 24 HOURS
Status: DISCONTINUED | OUTPATIENT
Start: 2020-03-31 | End: 2020-03-31 | Stop reason: HOSPADM

## 2020-03-30 RX ORDER — ONDANSETRON 2 MG/ML
4 INJECTION INTRAMUSCULAR; INTRAVENOUS EVERY 6 HOURS PRN
Status: DISCONTINUED | OUTPATIENT
Start: 2020-03-30 | End: 2020-03-31 | Stop reason: HOSPADM

## 2020-03-30 RX ORDER — CEFTRIAXONE 2 G/1
2 INJECTION, POWDER, FOR SOLUTION INTRAMUSCULAR; INTRAVENOUS EVERY 24 HOURS
Status: DISCONTINUED | OUTPATIENT
Start: 2020-03-30 | End: 2020-03-30

## 2020-03-30 RX ORDER — HYDROMORPHONE HYDROCHLORIDE 1 MG/ML
0.2 INJECTION, SOLUTION INTRAMUSCULAR; INTRAVENOUS; SUBCUTANEOUS
Status: DISCONTINUED | OUTPATIENT
Start: 2020-03-30 | End: 2020-03-31 | Stop reason: HOSPADM

## 2020-03-30 RX ORDER — PROCHLORPERAZINE 25 MG
25 SUPPOSITORY, RECTAL RECTAL EVERY 12 HOURS PRN
Status: DISCONTINUED | OUTPATIENT
Start: 2020-03-30 | End: 2020-03-31 | Stop reason: HOSPADM

## 2020-03-30 RX ORDER — BISACODYL 5 MG
10 TABLET, DELAYED RELEASE (ENTERIC COATED) ORAL DAILY PRN
Status: DISCONTINUED | OUTPATIENT
Start: 2020-03-30 | End: 2020-03-31 | Stop reason: HOSPADM

## 2020-03-30 RX ORDER — CALCIUM CARBONATE 500 MG/1
500 TABLET, CHEWABLE ORAL DAILY PRN
Status: DISCONTINUED | OUTPATIENT
Start: 2020-03-30 | End: 2020-03-31 | Stop reason: HOSPADM

## 2020-03-30 RX ORDER — ONDANSETRON 4 MG/1
4 TABLET, ORALLY DISINTEGRATING ORAL EVERY 6 HOURS PRN
Status: DISCONTINUED | OUTPATIENT
Start: 2020-03-30 | End: 2020-03-31 | Stop reason: HOSPADM

## 2020-03-30 RX ADMIN — CEFTRIAXONE 1 G: 1 INJECTION, POWDER, FOR SOLUTION INTRAMUSCULAR; INTRAVENOUS at 23:45

## 2020-03-30 RX ADMIN — OXYCODONE HYDROCHLORIDE 5 MG: 5 TABLET ORAL at 23:45

## 2020-03-30 RX ADMIN — OXYCODONE HYDROCHLORIDE 5 MG: 5 TABLET ORAL at 22:54

## 2020-03-30 NOTE — TELEPHONE ENCOUNTER
Call placed to pt with message from her provider.  Pt states she is at the [facility] room because she is in so much pain.    Unable to relay message.    Lucero Carter RN on 3/30/2020 at 5:57 PM

## 2020-03-30 NOTE — TELEPHONE ENCOUNTER
Should be seen by a provider doing in clinic visits.  I will not be doing in person visits for the foreseeable future.  Can try taking famotidine 20 milligram twice daily (available over the counter) to see if that helps.      Sun Breen MD

## 2020-03-30 NOTE — TELEPHONE ENCOUNTER
Dr. Breen-    Concern for abdominal pain.     Pain started on Saturday. Pain gone yesterday. Back today. She had a normal BM this morning.     Feels the pain below breasts and down to her belly button. 7 on a scale of 1-10. Comes and goes. Started with pain in her low/mid back Friday night/Sat am. Then moved to her abdomen.     Saturday she took an antacid and had a huge burp and felt much better. Did not have pain yesterday. It is back today. No fever, not tender to the touch, comes and goes, urinating ok, no hematuria.     Per protocol, she should be seen today. She would like to see Dr. Breen. Advised of restrictions with COVID 19. She requests message be sent to Dr. Breen for review and determination if she should do a telephone visit or see her in the clinic. She only wants message addressed by Dr. Breen and she understands she is not in the clinic today.     Will await for her response. In the meantime, if her symptoms get worse she should be evaluated in the ER. Pt agrees. She will call back as needed.Era Mcmillan RN on 3/30/2020 at 4:04 PM        Additional Information    Negative: Passed out (i.e., fainted, collapsed and was not responding)    Negative: Shock suspected (e.g., cold/pale/clammy skin, too weak to stand, low BP, rapid pulse)    Negative: Sounds like a life-threatening emergency to the triager    Negative: Chest pain    Negative: Pain is mainly in upper abdomen (if needed ask: 'is it mainly above the belly button?')    Negative: Abdominal pain and pregnant > 20 weeks    Negative: Abdominal pain and pregnant < 20 weeks    Negative: SEVERE abdominal pain (e.g., excruciating)    Negative: Vomiting red blood or black (coffee ground) material    Negative: Bloody, black, or tarry bowel movements    Negative: Constant abdominal pain lasting > 2 hours    Negative: Vomiting bile (green color)    Negative: Patient sounds very sick or weak to the triager    Negative: Vomiting and abdomen looks much more  "swollen than usual    Negative: White of the eyes have turned yellow (i.e., jaundice)    Negative: Blood in urine (red, pink, or tea-colored)    Negative: Fever > 103 F (39.4 C)    Negative: Fever > 101 F (38.3 C) and over 60 years of age    Negative: Fever > 100.0 F (37.8 C) and has diabetes mellitus or a weak immune system (e.g., HIV positive, cancer chemotherapy, organ transplant, splenectomy, chronic steroids)    Negative: Fever > 100.0 F (37.8 C) and bedridden (e.g., nursing home patient, stroke, chronic illness, recovering from surgery)    Negative: Pregnant or could be pregnant (i.e., missed last menstrual period)    MODERATE OR MILD pain that comes and goes (cramps) lasts > 24 hours    Answer Assessment - Initial Assessment Questions  1. LOCATION: \"Where does it hurt?\"       Mid abdomen.  2. RADIATION: \"Does the pain shoot anywhere else?\" (e.g., chest, back)      Low/mid back  3. ONSET: \"When did the pain begin?\" (e.g., minutes, hours or days ago)       saturday  4. SUDDEN: \"Gradual or sudden onset?\"      gradual  5. PATTERN \"Does the pain come and go, or is it constant?\"     - If constant: \"Is it getting better, staying the same, or worsening?\"       (Note: Constant means the pain never goes away completely; most serious pain is constant and it progresses)      - If intermittent: \"How long does it last?\" \"Do you have pain now?\"      (Note: Intermittent means the pain goes away completely between bouts)      Comes and goes  6. SEVERITY: \"How bad is the pain?\"  (e.g., Scale 1-10; mild, moderate, or severe)    - MILD (1-3): doesn't interfere with normal activities, abdomen soft and not tender to touch     - MODERATE (4-7): interferes with normal activities or awakens from sleep, tender to touch     - SEVERE (8-10): excruciating pain, doubled over, unable to do any normal activities       7  7. RECURRENT SYMPTOM: \"Have you ever had this type of abdominal pain before?\" If so, ask: \"When was the last time?\" and " "\"What happened that time?\"       no  8. CAUSE: \"What do you think is causing the abdominal pain?\"      Not sure  9. RELIEVING/AGGRAVATING FACTORS: \"What makes it better or worse?\" (e.g., movement, antacids, bowel movement)      Antacids maybe?  10. OTHER SYMPTOMS: \"Has there been any vomiting, diarrhea, constipation, or urine problems?\"        none  11. PREGNANCY: \"Is there any chance you are pregnant?\" \"When was your last menstrual period?\"        none    Protocols used: ABDOMINAL PAIN - FEMALE-A-OH      "

## 2020-03-31 ENCOUNTER — ANESTHESIA (OUTPATIENT)
Dept: SURGERY | Facility: CLINIC | Age: 50
End: 2020-03-31
Payer: COMMERCIAL

## 2020-03-31 ENCOUNTER — ANESTHESIA EVENT (OUTPATIENT)
Dept: SURGERY | Facility: CLINIC | Age: 50
End: 2020-03-31
Payer: COMMERCIAL

## 2020-03-31 ENCOUNTER — APPOINTMENT (OUTPATIENT)
Dept: GENERAL RADIOLOGY | Facility: CLINIC | Age: 50
End: 2020-03-31
Attending: SURGERY
Payer: COMMERCIAL

## 2020-03-31 VITALS
SYSTOLIC BLOOD PRESSURE: 143 MMHG | RESPIRATION RATE: 16 BRPM | BODY MASS INDEX: 25.63 KG/M2 | DIASTOLIC BLOOD PRESSURE: 79 MMHG | OXYGEN SATURATION: 99 % | HEIGHT: 65 IN | TEMPERATURE: 97.4 F | HEART RATE: 98 BPM

## 2020-03-31 LAB
ALBUMIN SERPL-MCNC: 3.5 G/DL (ref 3.4–5)
ALP SERPL-CCNC: 76 U/L (ref 40–150)
ALT SERPL W P-5'-P-CCNC: 31 U/L (ref 0–50)
ANION GAP SERPL CALCULATED.3IONS-SCNC: 7 MMOL/L (ref 3–14)
AST SERPL W P-5'-P-CCNC: 29 U/L (ref 0–45)
BASOPHILS # BLD AUTO: 0 10E9/L (ref 0–0.2)
BASOPHILS NFR BLD AUTO: 0.3 %
BILIRUB SERPL-MCNC: 1 MG/DL (ref 0.2–1.3)
BUN SERPL-MCNC: 8 MG/DL (ref 7–30)
CALCIUM SERPL-MCNC: 8.7 MG/DL (ref 8.5–10.1)
CHLORIDE SERPL-SCNC: 107 MMOL/L (ref 94–109)
CO2 SERPL-SCNC: 22 MMOL/L (ref 20–32)
CREAT SERPL-MCNC: 0.57 MG/DL (ref 0.52–1.04)
DIFFERENTIAL METHOD BLD: ABNORMAL
EOSINOPHIL # BLD AUTO: 0 10E9/L (ref 0–0.7)
EOSINOPHIL NFR BLD AUTO: 0.2 %
ERYTHROCYTE [DISTWIDTH] IN BLOOD BY AUTOMATED COUNT: 13.4 % (ref 10–15)
GFR SERPL CREATININE-BSD FRML MDRD: >90 ML/MIN/{1.73_M2}
GLUCOSE SERPL-MCNC: 128 MG/DL (ref 70–99)
HCG UR QL: NEGATIVE
HCT VFR BLD AUTO: 40.3 % (ref 35–47)
HGB BLD-MCNC: 13.2 G/DL (ref 11.7–15.7)
IMM GRANULOCYTES # BLD: 0 10E9/L (ref 0–0.4)
IMM GRANULOCYTES NFR BLD: 0.3 %
LYMPHOCYTES # BLD AUTO: 2 10E9/L (ref 0.8–5.3)
LYMPHOCYTES NFR BLD AUTO: 17.3 %
MCH RBC QN AUTO: 28.4 PG (ref 26.5–33)
MCHC RBC AUTO-ENTMCNC: 32.8 G/DL (ref 31.5–36.5)
MCV RBC AUTO: 87 FL (ref 78–100)
MONOCYTES # BLD AUTO: 0.8 10E9/L (ref 0–1.3)
MONOCYTES NFR BLD AUTO: 6.4 %
NEUTROPHILS # BLD AUTO: 8.9 10E9/L (ref 1.6–8.3)
NEUTROPHILS NFR BLD AUTO: 75.5 %
NRBC # BLD AUTO: 0 10*3/UL
NRBC BLD AUTO-RTO: 0 /100
PLATELET # BLD AUTO: 215 10E9/L (ref 150–450)
POTASSIUM SERPL-SCNC: 3.5 MMOL/L (ref 3.4–5.3)
PROT SERPL-MCNC: 7.5 G/DL (ref 6.8–8.8)
RBC # BLD AUTO: 4.65 10E12/L (ref 3.8–5.2)
SODIUM SERPL-SCNC: 136 MMOL/L (ref 133–144)
WBC # BLD AUTO: 11.8 10E9/L (ref 4–11)

## 2020-03-31 PROCEDURE — 25000128 H RX IP 250 OP 636: Performed by: INTERNAL MEDICINE

## 2020-03-31 PROCEDURE — 88304 TISSUE EXAM BY PATHOLOGIST: CPT | Mod: 26 | Performed by: SURGERY

## 2020-03-31 PROCEDURE — 47563 LAPARO CHOLECYSTECTOMY/GRAPH: CPT | Performed by: SURGERY

## 2020-03-31 PROCEDURE — 25000128 H RX IP 250 OP 636: Performed by: NURSE ANESTHETIST, CERTIFIED REGISTERED

## 2020-03-31 PROCEDURE — 36000060 ZZH SURGERY LEVEL 3 W FLUORO 1ST 30 MIN: Performed by: SURGERY

## 2020-03-31 PROCEDURE — 25500064 ZZH RX 255 OP 636: Performed by: SURGERY

## 2020-03-31 PROCEDURE — 80053 COMPREHEN METABOLIC PANEL: CPT | Performed by: INTERNAL MEDICINE

## 2020-03-31 PROCEDURE — 36000058 ZZH SURGERY LEVEL 3 EA 15 ADDTL MIN: Performed by: SURGERY

## 2020-03-31 PROCEDURE — 25000125 ZZHC RX 250: Performed by: SURGERY

## 2020-03-31 PROCEDURE — 36415 COLL VENOUS BLD VENIPUNCTURE: CPT | Performed by: INTERNAL MEDICINE

## 2020-03-31 PROCEDURE — 40000306 ZZH STATISTIC PRE PROC ASSESS II: Performed by: SURGERY

## 2020-03-31 PROCEDURE — 99217 ZZC OBSERVATION CARE DISCHARGE: CPT | Performed by: PHYSICIAN ASSISTANT

## 2020-03-31 PROCEDURE — 99203 OFFICE O/P NEW LOW 30 MIN: CPT | Mod: 57 | Performed by: SURGERY

## 2020-03-31 PROCEDURE — 25000132 ZZH RX MED GY IP 250 OP 250 PS 637: Performed by: SURGERY

## 2020-03-31 PROCEDURE — 25000128 H RX IP 250 OP 636: Performed by: SURGERY

## 2020-03-31 PROCEDURE — G0378 HOSPITAL OBSERVATION PER HR: HCPCS

## 2020-03-31 PROCEDURE — 81025 URINE PREGNANCY TEST: CPT | Performed by: ANESTHESIOLOGY

## 2020-03-31 PROCEDURE — 47563 LAPARO CHOLECYSTECTOMY/GRAPH: CPT | Mod: AS | Performed by: PHYSICIAN ASSISTANT

## 2020-03-31 PROCEDURE — 25800030 ZZH RX IP 258 OP 636: Performed by: PHYSICIAN ASSISTANT

## 2020-03-31 PROCEDURE — 96375 TX/PRO/DX INJ NEW DRUG ADDON: CPT | Mod: XS

## 2020-03-31 PROCEDURE — 37000008 ZZH ANESTHESIA TECHNICAL FEE, 1ST 30 MIN: Performed by: SURGERY

## 2020-03-31 PROCEDURE — 71000013 ZZH RECOVERY PHASE 1 LEVEL 1 EA ADDTL HR: Performed by: SURGERY

## 2020-03-31 PROCEDURE — 88304 TISSUE EXAM BY PATHOLOGIST: CPT | Performed by: SURGERY

## 2020-03-31 PROCEDURE — 27210794 ZZH OR GENERAL SUPPLY STERILE: Performed by: SURGERY

## 2020-03-31 PROCEDURE — 25000125 ZZHC RX 250: Performed by: NURSE ANESTHETIST, CERTIFIED REGISTERED

## 2020-03-31 PROCEDURE — 71000012 ZZH RECOVERY PHASE 1 LEVEL 1 FIRST HR: Performed by: SURGERY

## 2020-03-31 PROCEDURE — 71000027 ZZH RECOVERY PHASE 2 EACH 15 MINS: Performed by: SURGERY

## 2020-03-31 PROCEDURE — 25800025 ZZH RX 258: Performed by: SURGERY

## 2020-03-31 PROCEDURE — 85025 COMPLETE CBC W/AUTO DIFF WBC: CPT | Performed by: INTERNAL MEDICINE

## 2020-03-31 PROCEDURE — 37000009 ZZH ANESTHESIA TECHNICAL FEE, EACH ADDTL 15 MIN: Performed by: SURGERY

## 2020-03-31 PROCEDURE — 40000277 XR SURGERY CARM FLUORO LESS THAN 5 MIN W STILLS

## 2020-03-31 RX ORDER — SODIUM CHLORIDE, SODIUM LACTATE, POTASSIUM CHLORIDE, CALCIUM CHLORIDE 600; 310; 30; 20 MG/100ML; MG/100ML; MG/100ML; MG/100ML
INJECTION, SOLUTION INTRAVENOUS CONTINUOUS
Status: DISCONTINUED | OUTPATIENT
Start: 2020-03-31 | End: 2020-03-31 | Stop reason: HOSPADM

## 2020-03-31 RX ORDER — HYDRALAZINE HYDROCHLORIDE 20 MG/ML
2.5-5 INJECTION INTRAMUSCULAR; INTRAVENOUS EVERY 10 MIN PRN
Status: DISCONTINUED | OUTPATIENT
Start: 2020-03-31 | End: 2020-03-31 | Stop reason: HOSPADM

## 2020-03-31 RX ORDER — SODIUM CHLORIDE 9 MG/ML
INJECTION, SOLUTION INTRAVENOUS CONTINUOUS
Status: DISCONTINUED | OUTPATIENT
Start: 2020-03-31 | End: 2020-03-31 | Stop reason: HOSPADM

## 2020-03-31 RX ORDER — OXYCODONE HYDROCHLORIDE 5 MG/1
5-10 TABLET ORAL EVERY 4 HOURS PRN
Qty: 10 TABLET | Refills: 0 | Status: SHIPPED | OUTPATIENT
Start: 2020-03-31 | End: 2020-09-01

## 2020-03-31 RX ORDER — GLYCOPYRROLATE 0.2 MG/ML
INJECTION, SOLUTION INTRAMUSCULAR; INTRAVENOUS PRN
Status: DISCONTINUED | OUTPATIENT
Start: 2020-03-31 | End: 2020-03-31

## 2020-03-31 RX ORDER — FENTANYL CITRATE 50 UG/ML
25-50 INJECTION, SOLUTION INTRAMUSCULAR; INTRAVENOUS
Status: DISCONTINUED | OUTPATIENT
Start: 2020-03-31 | End: 2020-03-31 | Stop reason: HOSPADM

## 2020-03-31 RX ORDER — LIDOCAINE HYDROCHLORIDE 10 MG/ML
INJECTION, SOLUTION INFILTRATION; PERINEURAL PRN
Status: DISCONTINUED | OUTPATIENT
Start: 2020-03-31 | End: 2020-03-31

## 2020-03-31 RX ORDER — HYDROMORPHONE HYDROCHLORIDE 1 MG/ML
.3-.5 INJECTION, SOLUTION INTRAMUSCULAR; INTRAVENOUS; SUBCUTANEOUS EVERY 5 MIN PRN
Status: DISCONTINUED | OUTPATIENT
Start: 2020-03-31 | End: 2020-03-31 | Stop reason: HOSPADM

## 2020-03-31 RX ORDER — BUPIVACAINE HYDROCHLORIDE AND EPINEPHRINE 2.5; 5 MG/ML; UG/ML
INJECTION, SOLUTION EPIDURAL; INFILTRATION; INTRACAUDAL; PERINEURAL PRN
Status: DISCONTINUED | OUTPATIENT
Start: 2020-03-31 | End: 2020-03-31 | Stop reason: HOSPADM

## 2020-03-31 RX ORDER — OXYCODONE HYDROCHLORIDE 5 MG/1
5 TABLET ORAL
Status: COMPLETED | OUTPATIENT
Start: 2020-03-31 | End: 2020-03-31

## 2020-03-31 RX ORDER — FENTANYL CITRATE 50 UG/ML
INJECTION, SOLUTION INTRAMUSCULAR; INTRAVENOUS PRN
Status: DISCONTINUED | OUTPATIENT
Start: 2020-03-31 | End: 2020-03-31

## 2020-03-31 RX ORDER — ONDANSETRON 2 MG/ML
4 INJECTION INTRAMUSCULAR; INTRAVENOUS EVERY 30 MIN PRN
Status: DISCONTINUED | OUTPATIENT
Start: 2020-03-31 | End: 2020-03-31 | Stop reason: HOSPADM

## 2020-03-31 RX ORDER — LABETALOL 20 MG/4 ML (5 MG/ML) INTRAVENOUS SYRINGE
10
Status: DISCONTINUED | OUTPATIENT
Start: 2020-03-31 | End: 2020-03-31 | Stop reason: HOSPADM

## 2020-03-31 RX ORDER — CEFTRIAXONE 1 G/1
1 INJECTION, POWDER, FOR SOLUTION INTRAMUSCULAR; INTRAVENOUS ONCE
Status: COMPLETED | OUTPATIENT
Start: 2020-03-31 | End: 2020-03-31

## 2020-03-31 RX ORDER — NALOXONE HYDROCHLORIDE 0.4 MG/ML
.1-.4 INJECTION, SOLUTION INTRAMUSCULAR; INTRAVENOUS; SUBCUTANEOUS
Status: DISCONTINUED | OUTPATIENT
Start: 2020-03-31 | End: 2020-03-31 | Stop reason: HOSPADM

## 2020-03-31 RX ORDER — DEXAMETHASONE SODIUM PHOSPHATE 4 MG/ML
INJECTION, SOLUTION INTRA-ARTICULAR; INTRALESIONAL; INTRAMUSCULAR; INTRAVENOUS; SOFT TISSUE PRN
Status: DISCONTINUED | OUTPATIENT
Start: 2020-03-31 | End: 2020-03-31

## 2020-03-31 RX ORDER — ONDANSETRON 4 MG/1
4 TABLET, ORALLY DISINTEGRATING ORAL EVERY 30 MIN PRN
Status: DISCONTINUED | OUTPATIENT
Start: 2020-03-31 | End: 2020-03-31 | Stop reason: HOSPADM

## 2020-03-31 RX ORDER — NEOSTIGMINE METHYLSULFATE 1 MG/ML
VIAL (ML) INJECTION PRN
Status: DISCONTINUED | OUTPATIENT
Start: 2020-03-31 | End: 2020-03-31

## 2020-03-31 RX ORDER — LIDOCAINE 40 MG/G
CREAM TOPICAL
Status: DISCONTINUED | OUTPATIENT
Start: 2020-03-31 | End: 2020-03-31 | Stop reason: HOSPADM

## 2020-03-31 RX ORDER — PROPOFOL 10 MG/ML
INJECTION, EMULSION INTRAVENOUS PRN
Status: DISCONTINUED | OUTPATIENT
Start: 2020-03-31 | End: 2020-03-31

## 2020-03-31 RX ADMIN — DEXAMETHASONE SODIUM PHOSPHATE 4 MG: 4 INJECTION, SOLUTION INTRA-ARTICULAR; INTRALESIONAL; INTRAMUSCULAR; INTRAVENOUS; SOFT TISSUE at 13:03

## 2020-03-31 RX ADMIN — ROCURONIUM BROMIDE 40 MG: 10 INJECTION INTRAVENOUS at 13:03

## 2020-03-31 RX ADMIN — SODIUM CHLORIDE: 9 INJECTION, SOLUTION INTRAVENOUS at 12:56

## 2020-03-31 RX ADMIN — SODIUM CHLORIDE: 9 INJECTION, SOLUTION INTRAVENOUS at 14:08

## 2020-03-31 RX ADMIN — OXYCODONE HYDROCHLORIDE 5 MG: 5 TABLET ORAL at 15:42

## 2020-03-31 RX ADMIN — FENTANYL CITRATE 100 MCG: 50 INJECTION, SOLUTION INTRAMUSCULAR; INTRAVENOUS at 13:03

## 2020-03-31 RX ADMIN — Medication 4 MG: at 14:29

## 2020-03-31 RX ADMIN — CEFTRIAXONE 1 G: 1 INJECTION, POWDER, FOR SOLUTION INTRAMUSCULAR; INTRAVENOUS at 13:40

## 2020-03-31 RX ADMIN — ROCURONIUM BROMIDE 10 MG: 10 INJECTION INTRAVENOUS at 14:07

## 2020-03-31 RX ADMIN — GLYCOPYRROLATE 0.2 MG: 0.2 INJECTION, SOLUTION INTRAMUSCULAR; INTRAVENOUS at 13:03

## 2020-03-31 RX ADMIN — LIDOCAINE HYDROCHLORIDE 40 MG: 10 INJECTION, SOLUTION INFILTRATION; PERINEURAL at 13:03

## 2020-03-31 RX ADMIN — GLYCOPYRROLATE 0.6 MG: 0.2 INJECTION, SOLUTION INTRAMUSCULAR; INTRAVENOUS at 14:29

## 2020-03-31 RX ADMIN — ONDANSETRON 4 MG: 2 INJECTION INTRAMUSCULAR; INTRAVENOUS at 13:18

## 2020-03-31 RX ADMIN — SODIUM CHLORIDE: 9 INJECTION, SOLUTION INTRAVENOUS at 09:46

## 2020-03-31 RX ADMIN — HYDROMORPHONE HYDROCHLORIDE 1 MG: 1 INJECTION, SOLUTION INTRAMUSCULAR; INTRAVENOUS; SUBCUTANEOUS at 13:18

## 2020-03-31 RX ADMIN — ONDANSETRON 4 MG: 2 INJECTION INTRAMUSCULAR; INTRAVENOUS at 01:46

## 2020-03-31 RX ADMIN — METRONIDAZOLE 500 MG: 500 INJECTION, SOLUTION INTRAVENOUS at 07:49

## 2020-03-31 RX ADMIN — METRONIDAZOLE 500 MG: 500 INJECTION, SOLUTION INTRAVENOUS at 00:15

## 2020-03-31 RX ADMIN — HYDROMORPHONE HYDROCHLORIDE 0.2 MG: 1 INJECTION, SOLUTION INTRAMUSCULAR; INTRAVENOUS; SUBCUTANEOUS at 01:47

## 2020-03-31 RX ADMIN — MIDAZOLAM 2 MG: 1 INJECTION INTRAMUSCULAR; INTRAVENOUS at 12:56

## 2020-03-31 RX ADMIN — PROPOFOL 160 MG: 10 INJECTION, EMULSION INTRAVENOUS at 13:03

## 2020-03-31 NOTE — PHARMACY-ADMISSION MEDICATION HISTORY
Admission medication history interview status for this patient is complete. See [facility] admission navigator for allergy information, prior to admission medications and immunization status.     Medication history interview source(s):Patient  Medication history resources (including written lists, pill bottles, clinic record):None  Primary pharmacy:-    Changes made to PTA medication list:  Added: -  Deleted: -  Changed: -    Actions taken by pharmacist (provider contacted, etc):Called pt in room for Med rec     Additional medication history information:None    Medication reconciliation/reorder completed by provider prior to medication history?  no   (Y/N)     For patients on insulin therapy: no  (Y/N)  Sliding scale Novolog: -  Do you have a baseline novolog pre-meal dose:   __  units with meals or __ units/carb unit with meals  Do you eat three meals a day:  -  How many times do you check your blood glucose per day:  -  How many episodes of hypoglycemia do you have per week: -  Do you have a Continuous glucose monitor (CGM) : - (remind pt that not approved for hospital use)  Any specific barriers to therapy? -  (cost, comfortable with injections, confident with current diabetes regimen?)      Prior to Admission medications    Not on File

## 2020-03-31 NOTE — PROGRESS NOTES
ROOM # 228    Living Situation (if not independent, order SW consult): Home with    Facility name:  : [facility], [phone]    Activity level at baseline: Independent   Activity level on admit: Independent       Patient registered to observation; given Patient Bill of Rights; given the opportunity to ask questions about observation status and their plan of care.  Patient has been oriented to the observation room, bathroom and call light is in place.    Discussed discharge goals and expectations with patient/family.

## 2020-03-31 NOTE — CONSULTS
[facility] Surgery Consultation    Carie Hi MRN# [id number]   Age: 49 year old YOB: 1970     Date of Admission:  3/30/2020    Reason for consult: Abdominal pain, right upper quadrant       Requesting physician: Radha       Level of consult: Consult, follow and place orders           Assessment and Plan:   Assessment:   -  Patient Active Problem List    Diagnosis Date Noted     Acute cholecystitis 03/30/2020     Priority: Medium     Family history of diabetes mellitus 07/31/2012     Priority: Medium     CARDIOVASCULAR SCREENING; LDL GOAL LESS THAN 160 02/10/2010     Priority: Medium     Vitamin D deficiency 04/30/2008     Priority: Medium     Problem list name updated by automated process. Provider to review             Plan:   Discussed cholecystectomy with pt and  (via iPhone) - incision/scar, lap/open/conversion, duct/organ injury, infection, bleeding, post yasmeen SX and Rx, expected recovery  Teaching booklet given to pt  Discussed non-operative RX - low fat, dissolution, lithotripsy  They would like to go ahead with surgery            Chief Complaint:   Abdominal pain     History is obtained from the patient and electronic health record         History of Present Illness:   This patient is a 49 year old  female without a significant past medical history who presents with the following condition requiring a hospital admission: abdominal pain. Developed pain on 3/28. It has persisted with increases and decreases. Pain persists but is currently controlled with oral meds.           Past Medical History:   No past medical history on file.          Past Surgical History:   No past surgical history on file.          Social History:     Social History     Tobacco Use     Smoking status: Never Smoker     Smokeless tobacco: Never Used   Substance Use Topics     Alcohol use: No             Family History:     Family History   Problem Relation Age of Onset     Diabetes Father       Cardiovascular Father      Diabetes Mother      Cardiovascular Mother      Breast Cancer No family hx of      Colon Cancer No family hx of              Immunizations:     VACCINE/DOSE   Diptheria   DPT   DTAP   HBIG   Hepatitis A   Hepatitis B   HIB   Influenza   Measles   Meningococcal   MMR   Mumps   Pneumococcal   Polio   Rubella   Small Pox   TDAP   Varicella   Zoster             Allergies:   No Known Allergies          Medications:     Current Facility-Administered Medications   Medication     acetaminophen (TYLENOL) Suppository 650 mg     acetaminophen (TYLENOL) tablet 650 mg     bisacodyl (DULCOLAX) EC tablet 5 mg    Or     bisacodyl (DULCOLAX) EC tablet 10 mg    Or     bisacodyl (DULCOLAX) EC tablet 15 mg     bisacodyl (DULCOLAX) Suppository 10 mg     calcium carbonate (TUMS) chewable tablet 500 mg     cefTRIAXone (ROCEPHIN) 2 g vial to attach to  ml bag for ADULTS or NS 50 ml bag for PEDS     HYDROmorphone (PF) (DILAUDID) injection 0.2 mg     melatonin tablet 1 mg     metroNIDAZOLE (FLAGYL) infusion 500 mg     naloxone (NARCAN) injection 0.1-0.4 mg     ondansetron (ZOFRAN-ODT) ODT tab 4 mg    Or     ondansetron (ZOFRAN) injection 4 mg     oxyCODONE (ROXICODONE) tablet 5-10 mg     polyethylene glycol (MIRALAX) Packet 17 g     prochlorperazine (COMPAZINE) injection 10 mg    Or     prochlorperazine (COMPAZINE) tablet 10 mg    Or     prochlorperazine (COMPAZINE) Suppository 25 mg     senna-docusate (SENOKOT-S/PERICOLACE) 8.6-50 MG per tablet 1 tablet    Or     senna-docusate (SENOKOT-S/PERICOLACE) 8.6-50 MG per tablet 2 tablet             Review of Systems:   CV: NEGATIVE for chest pain, palpitations or peripheral edema  C: NEGATIVE for fever, chills, change in weight  E/M: NEGATIVE for ear, mouth and throat problems  R: NEGATIVE for significant cough or SOB          Physical Exam:   All vitals have been reviewed  Patient Vitals for the past 24 hrs:   BP Temp Temp src Pulse Resp SpO2   03/31/20 0440 (!)  163/90 97.8  F (36.6  C) Oral 58 16 100 %   03/30/20 2257 (!) 178/89 97.8  F (36.6  C) Oral 67 16 100 %   03/30/20 2100 (!) 173/88 98.4  F (36.9  C) Oral 70 16 100 %     No intake or output data in the 24 hours ending 03/31/20 0702  Neck:   skin normal and no stridor     Chest / Breast:   Nl resp effort     Abdomen:   soft, non-distended, tenderness noted in the right upper quadrant, voluntary guarding absent and no masses palpated             Data:   All laboratory data reviewed  Results for orders placed or performed during the hospital encounter of 03/30/20   Comprehensive metabolic panel     Status: Abnormal   Result Value Ref Range    Sodium 136 133 - 144 mmol/L    Potassium 3.5 3.4 - 5.3 mmol/L    Chloride 107 94 - 109 mmol/L    Carbon Dioxide 22 20 - 32 mmol/L    Anion Gap 7 3 - 14 mmol/L    Glucose 128 (H) 70 - 99 mg/dL    Urea Nitrogen 8 7 - 30 mg/dL    Creatinine 0.57 0.52 - 1.04 mg/dL    GFR Estimate >90 >60 mL/min/[1.73_m2]    GFR Estimate If Black >90 >60 mL/min/[1.73_m2]    Calcium 8.7 8.5 - 10.1 mg/dL    Bilirubin Total 1.0 0.2 - 1.3 mg/dL    Albumin 3.5 3.4 - 5.0 g/dL    Protein Total 7.5 6.8 - 8.8 g/dL    Alkaline Phosphatase 76 40 - 150 U/L    ALT 31 0 - 50 U/L    AST 29 0 - 45 U/L   CBC with platelets differential     Status: Abnormal   Result Value Ref Range    WBC 11.8 (H) 4.0 - 11.0 10e9/L    RBC Count 4.65 3.8 - 5.2 10e12/L    Hemoglobin 13.2 11.7 - 15.7 g/dL    Hematocrit 40.3 35.0 - 47.0 %    MCV 87 78 - 100 fl    MCH 28.4 26.5 - 33.0 pg    MCHC 32.8 31.5 - 36.5 g/dL    RDW 13.4 10.0 - 15.0 %    Platelet Count 215 150 - 450 10e9/L    Diff Method Automated Method     % Neutrophils 75.5 %    % Lymphocytes 17.3 %    % Monocytes 6.4 %    % Eosinophils 0.2 %    % Basophils 0.3 %    % Immature Granulocytes 0.3 %    Nucleated RBCs 0 0 /100    Absolute Neutrophil 8.9 (H) 1.6 - 8.3 10e9/L    Absolute Lymphocytes 2.0 0.8 - 5.3 10e9/L    Absolute Monocytes 0.8 0.0 - 1.3 10e9/L    Absolute Eosinophils  0.0 0.0 - 0.7 10e9/L    Absolute Basophils 0.0 0.0 - 0.2 10e9/L    Abs Immature Granulocytes 0.0 0 - 0.4 10e9/L    Absolute Nucleated RBC 0.0      CT scan of the abdomen:   No masses, free air, abcesses or significant abnormalities, GB distended, possible sludge or stones, normal appendix - images not available for review  CT scan interpreted by radiologist     Gallbladder ultrasound:   Gallstones seen, wall thickening reported, 6 mm CBD - images not available for review        Attestation:  I have reviewed today's vital signs, notes, medications, labs and imaging.  Amount of time performed on this consult: 45 minutes.    Allen Schilling MD

## 2020-03-31 NOTE — PLAN OF CARE
PRIMARY DIAGNOSIS: BILIARY COLIC/UNCOMPLICATED EARLY ACUTE CHOLECYSTITIS  OUTPATIENT/OBSERVATION GOALS TO BE MET BEFORE DISCHARGE:    1. Pain status: Improved-controlled with oral pain medications.  2. Stable vital signs and labs (if performed) at disposition: Yes  3. Tolerating adequate PO diet: Yes  4. Successful cholecystectomy or clear follow up plan with General Surgery team if immediate surgery not performed: Consult with General Surgery in AM   5. ADLs back to baseline?  Yes  6. Activity and level of assistance: Ambulating independently.  7. Barriers to discharge noted Yes, consult    Patient alert and oriented x4. Vitals are Temp: 97.8  F (36.6  C) Temp src: Oral BP: (!) 163/90 Pulse: 58   Resp: 16 SpO2: 100 % RA. Reports 5-6/10 pain in upper abdomen, PRN Dilaudid given with improvement in pain. Patient has 2 episodes of emesis during shift; PRN Zofran given. NPO for surgery consult. Up independently in room. Continuing with IV Rocephin and Flagyl. Plan for symptom management.       Discharge Planner Nurse   Safe discharge environment identified: Yes  Barriers to discharge: No       Entered by: Wen Fonseca 03/31/2020      Please review provider order for any additional goals.   Nurse to notify provider when observation goals have been met and patient is ready for discharge.

## 2020-03-31 NOTE — DISCHARGE INSTRUCTIONS
HOME CARE FOLLOWING LAPAROSCOPIC CHOLECYSTECTOMY  YOVANI Reyes, ELIZABETH Rich. DONATO Bell      IINCISIONAL CARE:  Replace the bandage over your incisions until all drainage stops, or if more comfortable to have in place.  If present, leave the steri-strips (white paper tapes) in place for 14 days after surgery.  If Dermabond (a type of skin glue) is present, leave in place until it wears/flakes off.   BATHING:  Avoid baths for 1 week after surgery.  Showers are okay.  You may wash your hair at any time.  Gently pat your incisions dry after bathing.  ACTIVITY:  Light Activity -- you may immediately be up and about as tolerated.  Driving -- you may drive when comfortable and off narcotic pain medications.  Light Work -- resume when comfortable off pain medications.  (If you can drive, you probably can work.)  Strenuous Work/Activity -- limit lifting to 20 pounds for 2 weeks.  Progressively increase with time.  Active Sports (running, biking, etc.) -- cautiously resume after 2 weeks.  DISCOMFORT:  Use pain medications as prescribed by your surgeon.  Take the pain medication with some food, when possible, to minimize side effects.  Intermittent use of ice packs at the incision sites may help during the first 48 hours.  Expect gradual improvement.  You may experience shoulder pain, which is due to the air placed within your abdomen during the procedure.  This is temporary and usually passes within 2 days.  DIET:  Drink plenty of fluids.  While taking pain medications, increase dietary fiber or add a fiber supplementation like Metamucil or Citrucel to help prevent constipation - a possible side effect of pain medications.  If taking Metamucil or Citrucel, take with plenty of fluids as instructed.  It is not uncommon to experience some bowel changes (loose stools or constipation) after surgery.  Your body has to adapt to you no longer having a gall bladder.  To help minimize this side  effect, avoid fatty foods for the first week after surgery.  You may then slowly increase the amount of fatty foods in your diet.    NAUSEA:  If nauseated from the anesthetic/pain meds; rest in bed, get up cautiously with assistance, and drink clear liquids (juice, tea, broth).  FOLLOW-UP AFTER SURGERY:  Our office will contact you approximately 2-3 weeks after surgery to check on your progress and answer any questions you may have.  If you are doing well, you will not need to return for an office appointment.  If any concerns are identified over the phone, we will help you make an appointment to see a provider.  If you have not received a phone call, have any questions or concerns, or would like to be seen, please call us at [phone].  We are located at [address], [facility] [address], [place] [address].    CONTACT US IF THE FOLLOWING DEVELOPS:  1.  A fever that is above 101   2.  If there is a large amount of drainage, bleeding, or swelling.  3.  Severe pain that is not relieved by your prescription.  4.  Drainage that is thick, cloudy, yellow, green or white.                                                             5.  Any other questions not answered by  Frequently Asked Questions  sheet.           GENERAL ANESTHESIA OR SEDATION ADULT DISCHARGE INSTRUCTIONS   SPECIAL PRECAUTIONS FOR 24 HOURS AFTER SURGERY    IT IS NOT UNUSUAL TO FEEL LIGHT-HEADED OR FAINT, UP TO 24 HOURS AFTER SURGERY OR WHILE TAKING PAIN MEDICATION.  IF YOU HAVE THESE SYMPTOMS; SIT FOR A FEW MINUTES BEFORE STANDING AND HAVE SOMEONE ASSIST YOU WHEN YOU GET UP TO WALK OR USE THE BATHROOM.    YOU SHOULD REST AND RELAX FOR THE NEXT 24 HOURS AND YOU MUST MAKE ARRANGEMENTS TO HAVE SOMEONE STAY WITH YOU FOR AT LEAST 24 HOURS AFTER YOUR DISCHARGE.  AVOID HAZARDOUS AND STRENUOUS ACTIVITIES.  DO NOT MAKE IMPORTANT DECISIONS FOR 24 HOURS.    DO NOT DRIVE ANY VEHICLE OR OPERATE MECHANICAL EQUIPMENT FOR 24 HOURS FOLLOWING THE END OF YOUR SURGERY.   EVEN THOUGH YOU MAY FEEL NORMAL, YOUR REACTIONS MAY BE AFFECTED BY THE MEDICATION YOU HAVE RECEIVED.    DO NOT DRINK ALCOHOLIC BEVERAGES FOR 24 HOURS FOLLOWING YOUR SURGERY.    DRINK CLEAR LIQUIDS (APPLE JUICE, GINGER ALE, 7-UP, BROTH, ETC.).  PROGRESS TO YOUR REGULAR DIET AS YOU FEEL ABLE.    YOU MAY HAVE A DRY MOUTH, A SORE THROAT, MUSCLES ACHES OR TROUBLE SLEEPING.  THESE SHOULD GO AWAY AFTER 24 HOURS.    CALL YOUR DOCTOR FOR ANY OF THE FOLLOWING:  SIGNS OF INFECTION (FEVER, GROWING TENDERNESS AT THE SURGERY SITE, A LARGE AMOUNT OF DRAINAGE OR BLEEDING, SEVERE PAIN, FOUL-SMELLING DRAINAGE, REDNESS OR SWELLING.    IT HAS BEEN OVER 8 TO 10 HOURS SINCE SURGERY AND YOU ARE STILL NOT ABLE TO URINATE (PASS WATER).

## 2020-03-31 NOTE — ANESTHESIA PREPROCEDURE EVALUATION
Anesthesia Pre-Procedure Evaluation    Patient: Carie Hi   MRN: [id number] : 1970          Preoperative Diagnosis: Cholecystitis [K81.9]    Procedure(s):  CHOLECYSTECTOMY, LAPAROSCOPIC, WITH CHOLANGIOGRAM    No past medical history on file.  No past surgical history on file.  Anesthesia Evaluation     . Pt has not had prior anesthetic            ROS/MED HX    ENT/Pulmonary:  - neg pulmonary ROS     Neurologic:  - neg neurologic ROS     Cardiovascular:  - neg cardiovascular ROS       METS/Exercise Tolerance:     Hematologic:  - neg hematologic  ROS       Musculoskeletal:  - neg musculoskeletal ROS       GI/Hepatic:     (+) cholecystitis/cholelithiasis,       Renal/Genitourinary:  - ROS Renal section negative       Endo:  - neg endo ROS       Psychiatric:         Infectious Disease:  - neg infectious disease ROS       Malignancy:         Other:                          Physical Exam  Normal systems: cardiovascular and pulmonary    Airway   Mallampati: II  TM distance: >3 FB  Neck ROM: full    Dental     Cardiovascular       Pulmonary             Lab Results   Component Value Date    WBC 11.8 (H) 2020    HGB 13.2 2020    HCT 40.3 2020     2020     2020    POTASSIUM 3.5 2020    CHLORIDE 107 2020    CO2 22 2020    BUN 8 2020    CR 0.57 2020     (H) 2020    OTIS 8.7 2020    ALBUMIN 3.5 2020    PROTTOTAL 7.5 2020    ALT 31 2020    AST 29 2020    ALKPHOS 76 2020    BILITOTAL 1.0 2020    TSH 2.38 2017    T4 1.02 2016    T3 100 2011       Preop Vitals  BP Readings from Last 3 Encounters:   20 (!) 161/84   19 126/70   17 116/74    Pulse Readings from Last 3 Encounters:   20 97   19 61   17 64      Resp Readings from Last 3 Encounters:   20 16   19 16    SpO2 Readings from Last 3 Encounters:   20 97%   19 98%  "  08/18/17 99%      Temp Readings from Last 1 Encounters:   03/31/20 98.5  F (36.9  C) (Oral)    Ht Readings from Last 1 Encounters:   11/06/19 1.651 m (5' 5\")      Wt Readings from Last 1 Encounters:   11/06/19 69.9 kg (154 lb)    Estimated body mass index is 25.63 kg/m  as calculated from the following:    Height as of 11/6/19: 1.651 m (5' 5\").    Weight as of 11/6/19: 69.9 kg (154 lb).       Anesthesia Plan      History & Physical Review  History and physical reviewed and following examination; no interval change.    ASA Status:  2 .    NPO Status:  > 8 hours    Plan for General with Intravenous and Propofol induction. Maintenance will be Balanced.    PONV prophylaxis:  Ondansetron (or other 5HT-3) and Dexamethasone or Solumedrol         Postoperative Care  Postoperative pain management:  IV analgesics.      Consents  Anesthetic plan, risks, benefits and alternatives discussed with:  Patient..                 Koby Pollack MD                    .  "

## 2020-03-31 NOTE — ANESTHESIA CARE TRANSFER NOTE
Patient: Carie Hi    Procedure(s):  CHOLECYSTECTOMY, LAPAROSCOPIC, WITH CHOLANGIOGRAM    Diagnosis: Cholecystitis [K81.9]  Diagnosis Additional Information: No value filed.    Anesthesia Type:   General     Note:  Anesthesia Care Transfer Notewriter    Vitals: (Last set prior to Anesthesia Care Transfer)    CRNA VITALS  3/31/2020 1416 - 3/31/2020 1453      3/31/2020             Pulse:  110    SpO2:  100 %    Resp Rate (observed):  (!) 6                Electronically Signed By: MELIDA Guido CRNA  March 31, 2020  2:53 PM

## 2020-03-31 NOTE — PLAN OF CARE
PRIMARY DIAGNOSIS: BILIARY COLIC/UNCOMPLICATED EARLY ACUTE CHOLECYSTITIS  OUTPATIENT/OBSERVATION GOALS TO BE MET BEFORE DISCHARGE:    1. Pain status: Pain free.  2. Stable vital signs and labs (if performed) at disposition: Yes, blood pressure running higher  3. Tolerating adequate PO diet: No, NPO  4. Successful cholecystectomy or clear follow up plan with General Surgery team if immediate surgery not performed Yes  5. ADLs back to baseline?  Yes  6. Activity and level of assistance: Ambulating independently.  7. Barriers to discharge noted No    RUQ tender with palpation, blood pressure running higher since admission, Dr. Vela at bedside and reports pt. Will have surgery at 1230. Pt. NPO since last night    Discharge Planner Nurse   Safe discharge environment identified: Yes  Barriers to discharge: No       Entered by: Jared Ortiz 03/31/2020 8:33 AM     Please review provider order for any additional goals.   Nurse to notify provider when observation goals have been met and patient is ready for discharge.

## 2020-03-31 NOTE — TELEPHONE ENCOUNTER
This writer was going to call patient for update. Per chart review patient is currently admitted at [facility] for acute cholecystitis and planned cholecystectomy.     Will call patient after discharge for hospital follow up.

## 2020-03-31 NOTE — ANESTHESIA POSTPROCEDURE EVALUATION
Patient: Carie Hi    Procedure(s):  CHOLECYSTECTOMY, LAPAROSCOPIC, WITH CHOLANGIOGRAM    Diagnosis:Cholecystitis [K81.9]  Diagnosis Additional Information: No value filed.    Anesthesia Type:  General    Note:  Anesthesia Post Evaluation    Patient location during evaluation: PACU  Patient participation: Able to fully participate in evaluation  Level of consciousness: awake  Pain management: adequate  Airway patency: patent  Cardiovascular status: acceptable  Respiratory status: acceptable  Hydration status: acceptable  PONV: none             Last vitals:  Vitals:    03/31/20 1550 03/31/20 1605 03/31/20 1610   BP: (!) 151/89 (!) 156/84    Pulse: 81 98    Resp: 17 14 14   Temp:   100  F (37.8  C)   SpO2: 99% 99% 100%         Electronically Signed By: Koby Pollack MD  March 31, 2020  4:22 PM

## 2020-03-31 NOTE — H&P
[facility]  History and Physical Hospitalist       Date of Admission:  3/30/2020    Assessment & Plan   Carie Hi is a 49 year old female who presents with diffuse abdominal pain (3/28) to [facility] urgent care. CT abdomen/pelvis was negative for acute intraabdominal process. RUQ ultrasound showed cholelithiasis. General surgery was called and recommended hospitalization for possible cholecystectomy.     Acute Cholecystitis  Patient presented from Urgent Care. Outside hospital labs - WB 8.7, Hgb 13.4, Plt 219, Lipase 65, and CMP wnl. UA no acute signs of infection. RUQ ultrasound showed gallbladder distention with wall thickening.  Gallstones are normal by.  Findings concerning for acute cholecystitis with mild proximal common bile duct dilation. CT abdomen/pelvis showed distended gallbladder with small amount of gallbladder wall edema and probable stone in the gallbladder neck.  Findings were suspicious for acute gallbladder inflammation/cholecystitis.  Normal appendix.  Incidental 4.1 cm uterine fibroid identified  -General surgery consulted - per patient plan on surgery in AM - make NPO at midnight - no cardiac or pulmonary history; has never had surgery  -Pain and symptom management with anti-ementics and narcotics  -NPO at midnight for surgery in AM  -Ceftriaxone 2 gm q 24 hours; Metronidazole 500 mg q 8 hours     FEN: Oral hydration; monitor - AM labs; Low fat diet/NPO at midnight  Activity: As tolerated  DVT Prophylaxis: Pneumatic Compression Devices  Code Status: Full Code  Expected discharge: Tomorrow, recommended to prior living arrangement once following surgical evaluation. .    Priya Garcia DO    Primary Care Physician   Sun Breen    Chief Complaint   Abdominal pain  History is obtained from the patient and chart review    History of Present Illness   Carie Hi is a 49 year old female who presents with diffuse abdominal pain (3/28) to [facility] urgent care. CT  abdomen/pelvis was negative for acute intraabdominal process. RUQ ultrasound showed cholelithiasis. General surgery was called and recommended hospitalization for possible cholecystectomy.     Patient developed abdominal pain 3/28/20. Pain has been intermittent since it started. Some relief with antacids and tylenol but minimal. Unclear what makes symptoms worse but cycles with increased severity of pain. Pain starts in back and radiates to the front diffusely throughout the abdomen. No nausea or vomiting at home but developed emesis on admission. Reported she had eaten little today and had food prior to coming to the hospital.     No chest pain, palpitations, SOB, cough, congestion, fevers, dysuria, hematuria, diarrhea, or constipation. No sick contacts.     Past Medical History    Follows with PCP for preventative cares - no medications; no medical diagnosis - note from 11/2019 PCP visit reviewed     Past Surgical History   No surgeries    Prior to Admission Medications   None     Allergies   No Known Allergies    Social History   I have reviewed this patient's social history and updated it with pertinent information if needed. Carie Hi  reports that she has never smoked. She has never used smokeless tobacco. She reports that she does not drink alcohol or use drugs.    Family History   Family history reviewed with patient and is noncontributory.    Review of Systems   The 10 point Review of Systems is negative other than noted in the HPI.     Physical Exam   Temp: 98.4  F (36.9  C) Temp src: Oral BP: (!) 173/88 Pulse: 70   Resp: 16 SpO2: 100 % O2 Device: None (Room air)    Vital Signs with Ranges    Constitutional: Awake, alert, cooperative, no apparent distress.  HEENT: AT. NC. Conjunctiva and pupils examined and normal. Moist mucous membranes, normal dentition.  Respiratory: Clear to auscultation bilaterally, no crackles or wheezing.  Cardiovascular: Regular rate and rhythm, normal S1 and S2, and no  murmur noted.  GI: Soft, non-distended, diffuse tenderness with increased pain in RUQ and LLQ, +murphys.  Normal bowel sounds.  Lymph/Hematologic: No anterior cervical or supraclavicular adenopathy.  Skin: No rashes, no cyanosis, no edema.  Musculoskeletal: No joint swelling, erythema or tenderness.  Neurologic: Cranial nerves 2-12 intact, normal strength and sensation.  Psychiatric: Alert, oriented to person, place and time, no obvious anxiety or depression.    Data   Data reviewed today:  I personally reviewed labs, imaging and notes from [facility] urgent [facility].     No lab results found in last 7 days.    No results found for this or any previous visit (from the past 24 hour(s)).

## 2020-03-31 NOTE — ANESTHESIA CARE TRANSFER NOTE
Patient: Carie Hi    Procedure(s):  CHOLECYSTECTOMY, LAPAROSCOPIC, WITH CHOLANGIOGRAM    Diagnosis: Cholecystitis [K81.9]  Diagnosis Additional Information: No value filed.    Anesthesia Type:   General     Note:  Airway :Face Mask  Patient transferred to:PACU  Handoff Report: Identifed the Patient, Identified the Reponsible Provider, Reviewed the pertinent medical history, Discussed the surgical course, Reviewed Intra-OP anesthesia mangement and issues during anesthesia, Set expectations for post-procedure period and Allowed opportunity for questions and acknowledgement of understanding      Vitals: (Last set prior to Anesthesia Care Transfer)    CRNA VITALS  3/31/2020 1416 - 3/31/2020 1453      3/31/2020             Pulse:  110    SpO2:  100 %    Resp Rate (observed):  (!) 6                Electronically Signed By: MELIDA Guido CRNA  March 31, 2020  2:53 PM

## 2020-03-31 NOTE — OP NOTE
General Surgery Operative Note    PREOPERATIVE DIAGNOSIS:  Cholecystitis [K81.9]    POSTOPERATIVE DIAGNOSIS:  Same, acute on chronic cholecystitis    PROCEDURE:  Laparoscopic Cholecystectomy with fluoroscopic intra-operative cholangiogram    ANESTHESIA:  General.    PREOPERATIVE MEDICATIONS:  Ancef IV.    SURGEON:  Allen Schilling MD    ASSISTANT:  Sofiya Wong PA-C The physicians assistant was medically necessary for their expertise in camera management, suctioning, suturing, and retraction.                            ESTIMATED BLOOD LOSS:  10cc's    INDICATIONS:  Carie Hi is a 49 year old female who has been experiencing episodes of RUQ abdominal pain.  Abdominal imaging has revealed gallstones and a thickened gallbladder wall.  Her pain is persisting.  She now presents for laparoscopic cholecystectomy after having risks and benefits reviewed in detail.      PROCEDURE:  The patient was brought to the operating room, placed supine on the table and after induction of anesthetic, the abdomen was prepped and draped in sterile manner.  Pause was performed.  An incision was made above the umbilicus and extended down to the midline fascia which was then opened.  A Neville trocar was inserted.  Gas was insufflated.  The laparoscope was advanced.  There was no evidence or underlying bowel or tissue injury.  Secondary trocars were placed under laparoscopic guidance.  The gallbladder was tense to grasp.  It was aspirated of 150 cc of green tinted clear fluid consistent with a hydropic gallbladder.  With the gallbladder decompressed, it was cleared of adhesions and grasped and retracted cephalad.  The infundibulum was cleared of adhesions and grasped and retracted laterally.  The region of the cystic duct was stripped of its peritoneal and fatty coverings and followed toward its confluence with the common hepatic and common bile ducts.  The region behind the infundibulum was also dissected, finding no aberrant  ducts but identifying the cystic artery.  A fluoroscopic cholangiogram was obtained with the Keane catheter and clamp.  This showed normal anatomy, no filling defects to suggest stones and good drainage into the duodenum.  Radiology reviewed the films and concurred.  The cholangiogram catheter was removed.  The gallbladder was then clipped at the infundibulum and 2 clips were placed on the cystic duct a generous distance from its confluence with the common hepatic and common bile ducts.  The cystic duct was then cut from the gallbladder.  Similarly, the cystic artery was triply clipped and divided.  The gallbladder was then removed from its hepatic fossa with electrocautery.  A small arterial branch was clipped with a single clip and a second arterial branch was noted going to the lateral aspect of the gallbladder and this was triply clipped and divided.  Once the gallbladder was removed from its fossa, it was placed in an Endocatch pouch and removed from the abdomen through the supraumbilical incision without spillage of bile or stones.  Marcaine was placed at each of the trocar sites and they were sequentially removed and viewed from within to be sure no bleeding was present and none was seen.  The final trocar was then removed after venting as much gas as possible from the abdomen and the fascia was specifically closed with 0 Vicryl sutures.  Subcuticular skin closure was performed followed by placement of Steri-Strips and dressings and she was returned to the recovery room in excellent condition with all sponge and needle counts correct, having tolerated the procedure well.    INTRAOPERATIVE FINDINGS: Acute on chronic cholecystitis, normal intraoperative cholangiogram, normal anterior stomach and duodenum, normal terminal ileum cecum and visualized portions of the appendix.  The fundus of the uterus was unremarkable.  No evidence for hernia on internal exam.    Throughout the procedure, the advanced acute  cholecystitis with significant edema and inflammation made dissection more difficult.  There is persistent oozing from all the tissues that were divided.  Extra time was spent achieving complete hemostasis throughout the procedure.    Specimens:   ID Type Source Tests Collected by Time Destination   A : Gallbladder and Contents Tissue Gallbladder and Contents SURGICAL PATHOLOGY EXAM Allen Schilling MD 3/31/2020  2:23 PM        Allen Schilling MD

## 2020-03-31 NOTE — PLAN OF CARE
PRIMARY DIAGNOSIS: BILIARY COLIC/UNCOMPLICATED EARLY ACUTE CHOLECYSTITIS  OUTPATIENT/OBSERVATION GOALS TO BE MET BEFORE DISCHARGE:    1. Pain status: Improved-controlled with oral pain medications.  2. Stable vital signs and labs (if performed) at disposition: Yes  3. Tolerating adequate PO diet: Yes  4. Successful cholecystectomy or clear follow up plan with General Surgery team if immediate surgery not performed: Consult with General Surgery in AM   5. ADLs back to baseline?  Yes  6. Activity and level of assistance: Ambulating independently.  7. Barriers to discharge noted Yes, consult    Patient alert and oriented x4. Vitals are Temp: 97.8  F (36.6  C) Temp src: Oral BP: (!) 178/89 Pulse: 67   Resp: 16 SpO2: 100 % RA. Reports 5-6/10 pain in upper abdomen, PRN Oxycodone given. Patient has 1 episode of emesis; declined antiemetic. NPO for surgery consult. Up independently in room. Continuing with IV Rocephin and Flagyl. Plan for symptom management.       Discharge Planner Nurse   Safe discharge environment identified: Yes  Barriers to discharge: No       Entered by: Wen Fonseca 03/31/2020      Please review provider order for any additional goals.   Nurse to notify provider when observation goals have been met and patient is ready for discharge.

## 2020-04-01 ENCOUNTER — TELEPHONE (OUTPATIENT)
Dept: PEDIATRICS | Facility: CLINIC | Age: 50
End: 2020-04-01

## 2020-04-01 LAB — COPATH REPORT: NORMAL

## 2020-04-01 NOTE — TELEPHONE ENCOUNTER
Please contact patient for In-patient follow up.  [phone] (home) [phone] (work)    Visit date: [id number]  Diagnosis listed: Acute Cholecystitis  Number of visits in past 12 months: 0 ED / 0 IP

## 2020-04-01 NOTE — TELEPHONE ENCOUNTER
"Hospital/TCU/ED for chronic condition Discharge Protocol    \"Hi, my name is Amie Gaytan RN, a registered nurse, and I am calling from [facility].  I am calling to follow up and see how things are going for you after your recent emergency visit/hospital/TCU stay.\"    Tell me how you are doing now that you are home?\" Taking oxycodone every 6 hours (1 tab) which is controlling her pain well. Incisions are covered with a bandage, no increased redness/pain or swelling. No drainage on bandage. No BM since coming home, advised the Pt to consider taking a stool softener.       Discharge Instructions    \"Let's review your discharge instructions.  What is/are the follow-up recommendations?  Pt. Response: Follow up with surgeon as advised.     \"Has an appointment with your primary care provider been scheduled?\"   Will follow up with surgery.     \"When you see the provider, I would recommend that you bring your medications with you.\"    Medications    \"Tell me what changed about your medicines when you discharged?\"    Changes to chronic meds?    0-1    \"What questions do you have about your medications?\"    None     New diagnoses of heart failure, COPD, diabetes, or MI?    No              Post Discharge Medication Reconciliation Status: discharge medications reconciled, continue medications without change.    Was MTM referral placed (*Make sure to put transitions as reason for referral)?   No    Call Summary    \"What questions or concerns do you have about your recent visit and your follow-up care?\"     none    \"If you have questions or things don't continue to improve, we encourage you contact us through the main clinic number (give number).  Even if the clinic is not open, triage nurses are available 24/7 to help you.     We would like you to know that our clinic has extended hours (provide information).  We also have urgent care (provide details on closest location and hours/contact info)\"      \"Thank you for " "your time and take care!\"    Amie Gaytan, RN   [facility] -- Triage Nurse               "

## 2020-04-03 NOTE — DISCHARGE SUMMARY
[facility]    Discharge Summary  Surgery    Date of Admission:  3/30/2020  Date of Discharge:  3/31/2020  5:27 PM  Discharging Provider: Allen Schilling MD  Discharge Summary Note completed by: Shoshana Armstrong PA-C on 4/3/2020  Date of Service: The patient was personally seen by Discharging Providers on the day of discharge.    Discharge Diagnoses   Active Problems:    Acute cholecystitis    Procedure/Surgery Information   Procedure(s):  CHOLECYSTECTOMY, LAPAROSCOPIC, WITH CHOLANGIOGRAM   Surgeon(s) and Role:     * Allen Schilling MD - Primary     * Sofiya Wong PA-C - Assisting     Specimens: ID Type Source Tests Collected by Time Destination   A : Gallbladder and Contents Tissue Gallbladder and Contents SURGICAL PATHOLOGY EXAM Allen Schilling MD 3/31/2020  2:23 PM         History of Present Illness   Carie Hi is a 49 year old female who presented with recurrent episodes of RUQ abdominal pain.  Abdominal imaging has revealed gallstones and a thickened gallbladder wall.  She was admitted by Hospitalist on March 30, and her pain is persisting.  She now presents for laparoscopic cholecystectomy after having risks and benefits reviewed in detail.    Hospital Course   Carie Hi was admitted on 3/30/2020.  The following problems were addressed during her hospitalization:  Patient Active Problem List   Diagnosis     Vitamin D deficiency     CARDIOVASCULAR SCREENING; LDL GOAL LESS THAN 160     Family history of diabetes mellitus     Acute cholecystitis     Anais-operative antibiotic therapy included: Rocephin, Flagyl.  Post-operative pain control: was via IV until able to tolerate PO intake and transitioned to PO pain meds.    Remarkable hospital course events:  Able to discharge home from recovery area after surgery.   Please see Hospitalist notes for further details if needed.    Carie met all criteria for release on 3/31/2020  5:27 PM.  She was afebrile, tolerating diet,  pain controlled on PO meds, ambulating well, and had return of bowel function.    Final pathology results: Gallbladder and contents, resection:   - Cholelithiasis.   - Chronic cholecystitis with reactive epithelial changes.   - Negative for dysplasia and malignancy.    Medications discontinued or adjusted during this hospitalization: see discharge med list below.    Antibiotics prescribed at discharge: None prescribed     Imaging study follow up needs:   -No studies require specific follow-up    Discharge Instructions and Follow-Up:  Discharge diet: Low fat   Discharge activity: Lifting restricted to 20 pounds  No heavy lifting, pushing, pulling for 4 weeks  No driving or operating machinery while on narcotic analgesics   Discharge follow-up: Follow up call from office to patient in 2-3 weeks   Wound/Incision care: Daily dressing changes  Ice to area for comfort  May get incision wet in shower but do not soak or scrub  Steri-strips off in 14 days       Shoshana Armstrong PA-C      Discharge Disposition   Discharged to home   Condition at discharge: Stable    Pending Results   Unresulted Labs Ordered in the Past 30 Days of this Admission     No orders found from 2/29/2020 to 3/31/2020.          Primary Care Physician   Sun Breen    Consultations This Hospital Stay   SURGERY GENERAL IP CONSULT    Discharge Orders      Reason for your hospital stay    You were hospitalized due to abdominal pain from your gallbladder.  Surgery was consulted for gallbladder removal.     Activity    Your activity upon discharge: activity as tolerated     When to contact your care team    Notify for fever >101.5; black or bloody stools; severe, persistent, or worsening nausea, vomiting, abdominal pain or distention, diarrhea, constipation; chest pain, difficulty breathing, swelling; dizziness, weakness, lightheadedness, fainting.  Proceed to the nearest emergency room for any urgent concerns.     Follow-up and recommended labs and  tests     Please follow up with pcp within one week.  Please follow up with General Surgery as recommended.    Your blood pressure was elevated while you were here likely due to pain, but I recommend you check your blood pressure twice daily at home and keep a log and follow up with your PCP within one week.     Diet    Follow this diet upon discharge: advance diet as tolerated.     Discharge Medications   Discharge Medication List as of 3/31/2020  3:30 PM      START taking these medications    Details   oxyCODONE (ROXICODONE) 5 MG tablet Take 1-2 tablets (5-10 mg) by mouth every 4 hours as needed for moderate to severe pain, Disp-10 tablet,R-0, E-Prescribe           Allergies   No Known Allergies     Data   Most Recent 3 CBC's:  Recent Labs   Lab Test 03/31/20 0618 08/18/17  0935 02/06/16  1606   WBC 11.8* 6.6 7.1   HGB 13.2 12.7 13.0   MCV 87 88 89    189 192      Most Recent 3 BMP's:  Recent Labs   Lab Test 03/31/20 0618 08/18/17  0935 02/06/16  1606    140 140   POTASSIUM 3.5 4.3 4.1   CHLORIDE 107 107 107   CO2 22 25 29   BUN 8 8 8   CR 0.57 0.65 0.69   ANIONGAP 7 8 4   OTIS 8.7 9.0 9.1   * 89 96     Most Recent 2 LFT's:  Recent Labs   Lab Test 03/31/20 0618 02/06/16  1606   AST 29 19   ALT 31 26   ALKPHOS 76 65   BILITOTAL 1.0 0.8     Results for orders placed or performed during the hospital encounter of 03/30/20   XR Surgery PENG L/T 5 Min Fluoro w Stills    Narrative    INTRAOPERATIVE CHOLANGIOGRAM 3/31/2020 1:51 PM     HISTORY: Laparoscopic cholecystectomy.     FLUOROSCOPY TIME: 8 seconds.    IMAGES: A total of 10 spot fluoro and/or cine loops are obtained  during exam.      Impression    IMPRESSION: C-arm images are performed during intraoperative  cholangiogram following laparoscopic cholecystectomy. Contrast  injection is performed through the cystic duct remnant. Contrast  readily fills the central intrahepatic ducts, common hepatic duct and  common bile duct. No filling defects  are present to suggest retained  stones. Contrast readily flows into the duodenum without delay.    Findings relayed to the surgeon at the time of the exam.    CUCO SAUCEDO MD

## 2020-04-04 NOTE — DISCHARGE SUMMARY
"[facility] Observation Unit Discharge Summary          Carie Hi MRN# [id number]   Age: 49 year old YOB: 1970     Date of Admission:  3/30/2020  Date of Discharge::  3/31/2020  Admitting Physician:  Priya Garcia DO  Discharge Physician:  Kim Todd PA-C  Primary Physician: Sun Breen     Primary Discharge Diagnoses:   Acute Cholecystitis s/p Laparoscopic Cholecystectomy with fluoroscopic intra-operative cholangiogram      Hospital Course:   For detail history, please refer to H & P from 3/30/2020. In brief, this is a 49 year old female \"who presents with diffuse abdominal pain (3/28) to [place] urgent care. CT abdomen/pelvis was negative for acute intraabdominal process. RUQ ultrasound showed cholelithiasis. General surgery was called and recommended hospitalization for possible cholecystectomy.      Patient developed abdominal pain 3/28/20. Pain has been intermittent since it started. Some relief with antacids and tylenol but minimal. Unclear what makes symptoms worse but cycles with increased severity of pain. Pain starts in back and radiates to the front diffusely throughout the abdomen. No nausea or vomiting at home but developed emesis on admission. Reported she had eaten little today and had food prior to coming to the hospital.      No chest pain, palpitations, SOB, cough, congestion, fevers, dysuria, hematuria, diarrhea, or constipation. No sick contacts.\"      Patient was admitted to the observation unit.  She remained hemodynamically stable and afebrile on room air.  Blood pressure was elevated 140-150s with no hx of hypertension reported.  Repeat labs showed CMP wnl.  Wbc 11.8.  General Surgery was consulted and patient underwent a Laparoscopic Cholecystectomy with fluoroscopic intra-operative cholangiogram.  She was discharged from the PACU.  Patient to follow up with General Surgery as instructed.  Patient instructed to monitor her blood pressure twice " "daily and follow up with PCP within one week for hospital follow up as well as blood pressure check.         Procedures/Imaging:     Results for orders placed or performed during the hospital encounter of 03/30/20   XR Surgery PENG L/T 5 Min Fluoro w Stills    Narrative    INTRAOPERATIVE CHOLANGIOGRAM 3/31/2020 1:51 PM     HISTORY: Laparoscopic cholecystectomy.     FLUOROSCOPY TIME: 8 seconds.    IMAGES: A total of 10 spot fluoro and/or cine loops are obtained  during exam.      Impression    IMPRESSION: C-arm images are performed during intraoperative  cholangiogram following laparoscopic cholecystectomy. Contrast  injection is performed through the cystic duct remnant. Contrast  readily fills the central intrahepatic ducts, common hepatic duct and  common bile duct. No filling defects are present to suggest retained  stones. Contrast readily flows into the duodenum without delay.    Findings relayed to the surgeon at the time of the exam.    CUCO SAUCEDO MD     Consultations:   General Surgery    Code Status:   Full    Allergies:   No Known Allergies     Subjective:   Patient reports pain is well controlled.  She denies nausea, chest pain, shortness of breath.    Physical Exam:   Blood pressure (!) 143/79, pulse 98, temperature 97.4  F (36.3  C), temperature source Temporal, resp. rate 16, height 1.651 m (5' 5\"), last menstrual period 03/31/2020, SpO2 99 %.  General: Alert, interactive, NAD, lying in bed, pleasant and cooperative.  HEENT: AT/NC, sclera anicteric, PERRL  Resp: clear to auscultation bilaterally, no crackles or wheezes  Cardiac: regular rate and rhythm, no murmur  Abdomen: Soft, mild RUQ tenderness, nondistended. +BS.  No rebound or guarding.  Extremities: No LE edema  Skin: Warm and dry  Neuro: Alert & oriented x 3, moves all extremities equally   Discharge Medicatios:        Discharge Medication List as of 3/31/2020  3:30 PM      START taking these medications    Details   oxyCODONE (ROXICODONE) 5 " MG tablet Take 1-2 tablets (5-10 mg) by mouth every 4 hours as needed for moderate to severe pain, Disp-10 tablet,R-0, E-Prescribe             Instructions Given to Patient as Discharge:     Discharge Procedure Orders   Reason for your hospital stay   Order Comments: You were hospitalized due to abdominal pain from your gallbladder.  Surgery was consulted for gallbladder removal.     Activity   Order Comments: Your activity upon discharge: activity as tolerated     Order Specific Question Answer Comments   Is discharge order? Yes      When to contact your care team   Order Comments: Notify for fever >101.5; black or bloody stools; severe, persistent, or worsening nausea, vomiting, abdominal pain or distention, diarrhea, constipation; chest pain, difficulty breathing, swelling; dizziness, weakness, lightheadedness, fainting.  Proceed to the nearest emergency room for any urgent concerns.     Follow-up and recommended labs and tests    Order Comments: Please follow up with pcp within one week.  Please follow up with General Surgery as recommended.    Your blood pressure was elevated while you were here likely due to pain, but I recommend you check your blood pressure twice daily at home and keep a log and follow up with your PCP within one week.     Diet   Order Comments: Follow this diet upon discharge: advance diet as tolerated.     Order Specific Question Answer Comments   Is discharge order? Yes        Pending Tests at Discharge:   Surgical pathology    Discharge Disposition:   Discharged to home     Kim Todd MS, PA-C  Hospitalist Service  Pager [phone]    >30 minutes was spent in discharge planning, care coordination, physical examination and medication reconciliation on the date of discharge, 3/31/2020

## 2020-04-06 ENCOUNTER — TELEPHONE (OUTPATIENT)
Dept: SURGERY | Facility: CLINIC | Age: 50
End: 2020-04-06

## 2020-04-06 NOTE — TELEPHONE ENCOUNTER
Called and informed patient (per PA) if dark stools continue, she should follow up with PCP as this wouldn't be related to gallbladder surgery.    Patient agreed.    She will call PRN.    Zohreh Rayo RN-BSN

## 2020-04-06 NOTE — TELEPHONE ENCOUNTER
"Procedure: Laparoscopic cholecystectomy with fluoroscopic intra-operative cholangiogram    Date: 03/31/2020    Surgeon: Shakir    Patient calling to discuss dark colored stools.  She reports she has been having regular bowel movements for a few days now, but they are \"lighter black\" in color.  Other than the dark stools, she is feeling well. She only had to utilize the narcotic for a couple of days after surgery.    She denies any fever. No n/v. No issues with weakness, dizziness, or lightheadedness.    She is maintaining low fat diet.  Discussed advancement of diet with patient.    Informed her that darker stools right now do not sound concerning, especially because she is feeling well otherwise. Encouraged her to continue to push fluids and to continue to monitor stools.    She should call with any new, persisting, or worsening symptoms.    She is in agreement to plan.    Will send FYI to Physician Assistant in the case that additional recommendations are warranted.    Zohreh Rayo RN-BSN    "

## 2020-04-06 NOTE — TELEPHONE ENCOUNTER
Name of caller: Patient    Reason for Call:  Dark Stools    Surgeon:  Dr. Schilling    Recent Surgery:  Yes.    If yes, when & what type:  GB 3/31/20      Best phone number to reach pt at is: [phone]  Ok to leave a message with medical info? Yes.    Pharmacy preferred (if calling for a refill): na

## 2020-06-15 ENCOUNTER — TELEPHONE (OUTPATIENT)
Dept: PEDIATRICS | Facility: CLINIC | Age: 50
End: 2020-06-15

## 2020-06-15 DIAGNOSIS — Z13.220 SCREENING FOR LIPID DISORDERS: ICD-10-CM

## 2020-06-15 DIAGNOSIS — R03.0 ELEVATED BP WITHOUT DIAGNOSIS OF HYPERTENSION: Primary | ICD-10-CM

## 2020-06-15 DIAGNOSIS — Z83.3 FAMILY HISTORY OF DIABETES MELLITUS: ICD-10-CM

## 2020-06-15 DIAGNOSIS — E55.9 VITAMIN D DEFICIENCY: ICD-10-CM

## 2020-06-15 NOTE — TELEPHONE ENCOUNTER
Appointment with Dr. Ornelas and doesn't have lab-only appointment.  Can defer to ordering at appointment I think

## 2020-06-15 NOTE — TELEPHONE ENCOUNTER
Order/Referral Request:  Who is requesting: Ramya Darnell  Orders being requested: Annual labs  Reason service is needed/diagnosis: n/a  When are orders needed by: 9/3/2020  Has this been discussed with Provider: No  Does patient have a preference on a Group/Provider/Facility? [facility]  Does patient have an appointment scheduled: Yes: 9/3/2020 physical  Where to send Orders: place on chart  Okay to leave detailed message?  No at Home number on file [phone] (home)    Routing

## 2020-06-16 ENCOUNTER — MYC MEDICAL ADVICE (OUTPATIENT)
Dept: PEDIATRICS | Facility: CLINIC | Age: 50
End: 2020-06-16

## 2020-06-16 DIAGNOSIS — Z13.1 SCREENING FOR DIABETES MELLITUS: Primary | ICD-10-CM

## 2020-06-16 DIAGNOSIS — Z13.220 SCREENING CHOLESTEROL LEVEL: ICD-10-CM

## 2020-06-16 NOTE — TELEPHONE ENCOUNTER
Pt schedule for 9/3  "[employer] message sent to patient with info below.     Jesusita Vogt MA// June 16, 2020 4:42 PM

## 2020-06-16 NOTE — TELEPHONE ENCOUNTER
Agree - I can order labs when I see her.     Of note her 9/3 with me is a physical and last was < 1 year ago. Please have her make sure this is okay with insurance.     Thanks!  SMILEY Ornelas MD  Internal Medicine-Pediatrics

## 2020-06-17 NOTE — TELEPHONE ENCOUNTER
Labs ordered - can schedule fasting lab only week prior. We can order additional labs if needed at appointment.     SMILEY Ornelas MD  Internal Medicine-Pediatrics

## 2020-06-17 NOTE — TELEPHONE ENCOUNTER
mgs received  From patient wondering if fasting labs are needed for physical?     Pt stated will check with insurance, regarding 1< yr  Physical.     Forwarding to RACHEL.       Jesusita Vogt MA// June 17, 2020 11:39 AM

## 2020-07-10 ENCOUNTER — ANCILLARY PROCEDURE (OUTPATIENT)
Dept: MAMMOGRAPHY | Facility: CLINIC | Age: 50
End: 2020-07-10
Payer: COMMERCIAL

## 2020-07-10 DIAGNOSIS — Z12.31 VISIT FOR SCREENING MAMMOGRAM: ICD-10-CM

## 2020-07-10 PROCEDURE — 77067 SCR MAMMO BI INCL CAD: CPT | Mod: TC

## 2020-07-14 ENCOUNTER — HOSPITAL ENCOUNTER (OUTPATIENT)
Dept: MAMMOGRAPHY | Facility: CLINIC | Age: 50
Discharge: HOME | End: 2020-07-14
Attending: INTERNAL MEDICINE | Admitting: INTERNAL MEDICINE
Payer: COMMERCIAL

## 2020-07-14 DIAGNOSIS — R92.8 ABNORMAL MAMMOGRAM: ICD-10-CM

## 2020-07-14 PROCEDURE — 77065 DX MAMMO INCL CAD UNI: CPT | Mod: RT

## 2020-07-15 DIAGNOSIS — R92.8 ABNORMAL MAMMOGRAM: Primary | ICD-10-CM

## 2020-08-26 DIAGNOSIS — Z13.1 SCREENING FOR DIABETES MELLITUS: ICD-10-CM

## 2020-08-26 DIAGNOSIS — Z13.220 SCREENING CHOLESTEROL LEVEL: ICD-10-CM

## 2020-08-26 LAB
CHOLEST SERPL-MCNC: 124 MG/DL
GLUCOSE SERPL-MCNC: 95 MG/DL (ref 70–99)
HDLC SERPL-MCNC: 37 MG/DL
LDLC SERPL CALC-MCNC: 66 MG/DL
NONHDLC SERPL-MCNC: 87 MG/DL
TRIGL SERPL-MCNC: 104 MG/DL

## 2020-08-26 PROCEDURE — 36415 COLL VENOUS BLD VENIPUNCTURE: CPT | Performed by: INTERNAL MEDICINE

## 2020-08-26 PROCEDURE — 80061 LIPID PANEL: CPT | Performed by: INTERNAL MEDICINE

## 2020-08-26 PROCEDURE — 82947 ASSAY GLUCOSE BLOOD QUANT: CPT | Performed by: INTERNAL MEDICINE

## 2020-08-31 NOTE — PATIENT INSTRUCTIONS
Preventive Health Recommendations  Female Ages 40 to 49    Yearly exam:     See your health care provider every year in order to  1. Review health changes.   2. Discuss preventive care.    3. Review your medicines if your doctor prescribed any.      Get a Pap test every three years (unless you have an abnormal result and your provider advises testing more often).      If you get Pap tests with HPV test, you only need to test every 5 years, unless you have an abnormal result. You do not need a Pap test if your uterus was removed (hysterectomy) and you have not had cancer.      You should be tested each year for STDs (sexually transmitted diseases), if you're at risk.     Ask your doctor if you should have a mammogram.      Have a colonoscopy (test for colon cancer) if someone in your family has had colon cancer or polyps before age 50.       Have a cholesterol test every 5 years.       Have a diabetes test (fasting glucose) after age 45. If you are at risk for diabetes, you should have this test every 3 years.    Shots: Get a flu shot each year. Get a tetanus shot every 10 years.     Nutrition:     Eat at least 5 servings of fruits and vegetables each day.    Eat whole-grain bread, whole-wheat pasta and brown rice instead of white grains and rice.    Get adequate Calcium and Vitamin D.      Lifestyle    Exercise at least 150 minutes a week (an average of 30 minutes a day, 5 days a week). This will help you control your weight and prevent disease.    Limit alcohol to one drink per day.    No smoking.     Wear sunscreen to prevent skin cancer.    See your dentist every six months for an exam and cleaning.  Preventive Health Recommendations  Female Ages 40 to 49    Yearly exam:   See your health care provider every year in order to  Review health changes.   Discuss preventive care.    Review your medicines if your doctor prescribed any.    Get a Pap test every three years (unless you have an abnormal result and your  provider advises testing more often).    If you get Pap tests with HPV test, you only need to test every 5 years, unless you have an abnormal result. You do not need a Pap test if your uterus was removed (hysterectomy) and you have not had cancer.    You should be tested each year for STDs (sexually transmitted diseases), if you're at risk.   Ask your doctor if you should have a mammogram.    Have a colonoscopy (test for colon cancer) if someone in your family has had colon cancer or polyps before age 50.     Have a cholesterol test every 5 years.     Have a diabetes test (fasting glucose) after age 45. If you are at risk for diabetes, you should have this test every 3 years.    Shots: Get a flu shot each year. Get a tetanus shot every 10 years.     Nutrition:   Eat at least 5 servings of fruits and vegetables each day.  Eat whole-grain bread, whole-wheat pasta and brown rice instead of white grains and rice.  Get adequate Calcium and Vitamin D.      Lifestyle  Exercise at least 150 minutes a week (an average of 30 minutes a day, 5 days a week). This will help you control your weight and prevent disease.  Limit alcohol to one drink per day.  No smoking.   Wear sunscreen to prevent skin cancer.  See your dentist every six months for an exam and cleaning.        Keep an eye on your blood pressure, but don't stress about it.   Try a low salt diet for at least two weeks to see if this improves your blood pressure.     Avoid refined carbohydrates and eat healthy snacks with healthy fats for you blood pressure and cholesterol.

## 2020-08-31 NOTE — PROGRESS NOTES
"   SUBJECTIVE:   CC: Carie Hi is an 50 year old woman who presents for preventive health visit.     Healthy Habits:    Do you get at least three servings of calcium containing foods daily (dairy, green leafy vegetables, etc.)? { :351021::\"yes\"}    Amount of exercise or daily activities, outside of work: { :632646}    Problems taking medications regularly { :115768::\"No\"}    Medication side effects: { :130823::\"No\"}    Have you had an eye exam in the past two years? { :125371}    Do you see a dentist twice per year? { :806245}    Do you have sleep apnea, excessive snoring or daytime drowsiness?{ :711374}      {additional problems to add (Optional):500677}    Today's PHQ-2 Score:   PHQ-2 ( 1999 [employer]) 11/6/2019 8/15/2017   Q1: Little interest or pleasure in doing things 0 0   Q2: Feeling down, depressed or hopeless 0 0   PHQ-2 Score 0 0   Q1: Little interest or pleasure in doing things Not at all Not at all   Q2: Feeling down, depressed or hopeless Not at all Not at all   PHQ-2 Score 0 0     {PHQ-2 LOOK IN ASSESSMENTS (Optional) :986487}  Abuse: Current or Past(Physical, Sexual or Emotional)- {YES/NO/NA:588572}  Do you feel safe in your environment? {YES/NO/NA:990213}        Social History     Tobacco Use     Smoking status: Never Smoker     Smokeless tobacco: Never Used   Substance Use Topics     Alcohol use: No     If you drink alcohol do you typically have >3 drinks per day or >7 drinks per week? {ETOH :271901}                     Reviewed orders with patient.  Reviewed health maintenance and updated orders accordingly - {Yes/No:668231::\"Yes\"}  {Chronicprobdata (Optional):430993}    {Mammo Decision Support (Optional):027305}    Pertinent mammograms are reviewed under the imaging tab.  History of abnormal Pap smear: {PAP HX:655030}  PAP / HPV Latest Ref Rng & Units 2/16/2016 4/15/2011 4/25/2008   PAP - NIL NIL NIL   HPV 16 DNA NEG Negative - -   HPV 18 DNA NEG Negative - -   OTHER HR HPV NEG Negative - - " "    Reviewed and updated as needed this visit by clinical staff         Reviewed and updated as needed this visit by Provider        {HISTORY OPTIONS (Optional):668884}    ROS:  { :997985}    OBJECTIVE:   There were no vitals taken for this visit.  EXAM:  {Exam Choices:974870}    {Diagnostic Test Results (Optional):892369::\"Diagnostic Test Results:\",\"Labs reviewed in Epic\"}    ASSESSMENT/PLAN:   {Diag Picklist:525893}    COUNSELING:   {FEMALE COUNSELING MESSAGES:279305::\"Reviewed preventive health counseling, as reflected in patient instructions\"}    Estimated body mass index is 25.63 kg/m  as calculated from the following:    Height as of 3/31/20: 1.651 m (5' 5\").    Weight as of 11/6/19: 69.9 kg (154 lb).    {Weight Management Plan (ACO) Complete if BMI is abnormal-  Ages 18-64  BMI >24.9.  Age 65+ with BMI <23 or >30 (Optional):752359}    She reports that she has never smoked. She has never used smokeless tobacco.      Counseling Resources:  ATP IV Guidelines  Pooled Cohorts Equation Calculator  Breast Cancer Risk Calculator  BRCA-Related Cancer Risk Assessment: FHS-7 Tool  FRAX Risk Assessment  ICSI Preventive Guidelines  Dietary Guidelines for Americans, 2010  USDA's MyPlate  ASA Prophylaxis  Lung CA Screening    Kim Lama MD  [facility]  "

## 2020-09-01 ENCOUNTER — OFFICE VISIT (OUTPATIENT)
Dept: PEDIATRICS | Facility: CLINIC | Age: 50
End: 2020-09-01
Payer: COMMERCIAL

## 2020-09-01 VITALS
HEIGHT: 65 IN | OXYGEN SATURATION: 100 % | RESPIRATION RATE: 16 BRPM | SYSTOLIC BLOOD PRESSURE: 138 MMHG | TEMPERATURE: 98.1 F | BODY MASS INDEX: 23.41 KG/M2 | WEIGHT: 140.5 LBS | DIASTOLIC BLOOD PRESSURE: 74 MMHG | HEART RATE: 74 BPM

## 2020-09-01 DIAGNOSIS — Z00.00 ROUTINE GENERAL MEDICAL EXAMINATION AT A HEALTH CARE FACILITY: Primary | ICD-10-CM

## 2020-09-01 DIAGNOSIS — Z82.49 FAMILY HISTORY OF ISCHEMIC HEART DISEASE: ICD-10-CM

## 2020-09-01 DIAGNOSIS — Z83.3 FAMILY HISTORY OF DIABETES MELLITUS: ICD-10-CM

## 2020-09-01 PROCEDURE — 90471 IMMUNIZATION ADMIN: CPT | Performed by: INTERNAL MEDICINE

## 2020-09-01 PROCEDURE — 99396 PREV VISIT EST AGE 40-64: CPT | Mod: 25 | Performed by: INTERNAL MEDICINE

## 2020-09-01 PROCEDURE — 90686 IIV4 VACC NO PRSV 0.5 ML IM: CPT | Performed by: INTERNAL MEDICINE

## 2020-09-01 ASSESSMENT — ENCOUNTER SYMPTOMS
CONSTIPATION: 0
DIZZINESS: 0
FREQUENCY: 0
PALPITATIONS: 0
JOINT SWELLING: 0
NERVOUS/ANXIOUS: 0
FEVER: 0
HEARTBURN: 0
SHORTNESS OF BREATH: 0
HEMATOCHEZIA: 0
COUGH: 0
ABDOMINAL PAIN: 0
EYE PAIN: 0
DIARRHEA: 0
HEADACHES: 0
WEAKNESS: 0
DYSURIA: 0
SORE THROAT: 0
MYALGIAS: 0
CHILLS: 0
NAUSEA: 0
PARESTHESIAS: 0
HEMATURIA: 0
BREAST MASS: 0
ARTHRALGIAS: 0

## 2020-09-01 ASSESSMENT — MIFFLIN-ST. JEOR: SCORE: 1268.79

## 2020-09-01 NOTE — PROGRESS NOTES
SUBJECTIVE:   CC: Carie Hi is an 50 year old woman who presents for preventive health visit.     Healthy Habits:     Getting at least 3 servings of Calcium per day:  Yes    Bi-annual eye exam:  Yes    Dental care twice a year:  Yes    Sleep apnea or symptoms of sleep apnea:  None    Diet:  Vegetarian/vegan    Frequency of exercise:  2-3 days/week    Duration of exercise:  15-30 minutes    Taking medications regularly:  Yes    Medication side effects:  Not applicable    PHQ-2 Total Score: 0    Additional concerns today:  Yes    -Had gallbladder removed in March 2020. Had follow up questions.   -Wants to discuss blood pressure     She had a cholecystectomy just less than a year ago, has been wondering if this is an indication of other problems in her body.  After gallbladder surgery she had some postoperative hypertension and it was recommended to take blood pressure at home. Lately has been in 130-140 range.  She is wondering what she can do to bring this down.  In addition, she just had blood cholesterol test, is wondering what she can do to bring up the HDL.    She used to work out at the gym, and has had little time to do this since COVID.  Is having trouble finding time for regular exercise.  She cooks at home, generally with vegetables.  She is vegetarian.  She does eat pasta, refined carbohydrates, and junk food occasionally although she does recognize that this is not the healthy choice.  She eats  whole-wheat flour pancakes and white rice every day, realizes that this may be what she has to change to increase HDL and potentially control blood pressure.        Today's PHQ-2 Score:   PHQ-2 ( 1999 Pfizer) 9/1/2020   Q1: Little interest or pleasure in doing things 0   Q2: Feeling down, depressed or hopeless 0   PHQ-2 Score 0   Q1: Little interest or pleasure in doing things Not at all   Q2: Feeling down, depressed or hopeless Not at all   PHQ-2 Score 0     Abuse: Current or Past (Physical,  Sexual or Emotional) - No  Do you feel safe in your environment? Yes    Social History     Tobacco Use     Smoking status: Never Smoker     Smokeless tobacco: Never Used   Substance Use Topics     Alcohol use: No     Alcohol Use 9/1/2020   Prescreen: >3 drinks/day or >7 drinks/week? No   Prescreen: >3 drinks/day or >7 drinks/week? -     Reviewed orders with patient.  Reviewed health maintenance and updated orders accordingly - Yes  Labs reviewed in EPIC  BP Readings from Last 3 Encounters:   09/01/20 138/74   03/31/20 (!) 143/79   11/06/19 126/70    Wt Readings from Last 3 Encounters:   09/01/20 63.7 kg (140 lb 8 oz)   11/06/19 69.9 kg (154 lb)   08/18/17 68 kg (150 lb)                  Patient Active Problem List   Diagnosis     Vitamin D deficiency     CARDIOVASCULAR SCREENING; LDL GOAL LESS THAN 160     Family history of diabetes mellitus     Acute cholecystitis     Abnormal mammogram - Short Interval Follow Up 1/2021     Past Surgical History:   Procedure Laterality Date     LAPAROSCOPIC CHOLECYSTECTOMY WITH CHOLANGIOGRAMS N/A 3/31/2020    Procedure: CHOLECYSTECTOMY, LAPAROSCOPIC, WITH CHOLANGIOGRAM;  Surgeon: Allen Schilling MD;  Location:  OR       Social History     Tobacco Use     Smoking status: Never Smoker     Smokeless tobacco: Never Used   Substance Use Topics     Alcohol use: No     Family History   Problem Relation Age of Onset     Diabetes Father      Cardiovascular Father      Diabetes Mother      Cardiovascular Mother      Breast Cancer No family hx of      Colon Cancer No family hx of            Mammogram Screening: Patient under age 50, mutual decision reflected in health maintenance.      Pertinent mammograms are reviewed under the imaging tab.  History of abnormal Pap smear: NO - age 30-65 PAP every 5 years with negative HPV co-testing recommended  PAP / HPV Latest Ref Rng & Units 2/16/2016 4/15/2011 4/25/2008   PAP - NIL NIL NIL   HPV 16 DNA NEG Negative - -   HPV 18 DNA NEG Negative - -    OTHER HR HPV NEG Negative - -     Reviewed and updated as needed this visit by clinical staff  Tobacco  Allergies  Meds  Problems  Med Hx  Surg Hx  Fam Hx  Soc Hx          Reviewed and updated as needed this visit by Provider  Tobacco  Allergies  Meds  Problems  Med Hx  Surg Hx  Fam Hx        History reviewed. No pertinent past medical history.     Review of Systems   Constitutional: Negative for chills and fever.   HENT: Negative for congestion, ear pain, hearing loss and sore throat.    Eyes: Negative for pain and visual disturbance.   Respiratory: Negative for cough and shortness of breath.    Cardiovascular: Negative for chest pain, palpitations and peripheral edema.   Gastrointestinal: Negative for abdominal pain, constipation, diarrhea, heartburn, hematochezia and nausea.   Breasts:  Negative for tenderness, breast mass and discharge.   Genitourinary: Negative for dysuria, frequency, genital sores, hematuria, pelvic pain, urgency, vaginal bleeding and vaginal discharge.   Musculoskeletal: Negative for arthralgias, joint swelling and myalgias.   Skin: Negative for rash.   Neurological: Negative for dizziness, weakness, headaches and paresthesias.   Psychiatric/Behavioral: Negative for mood changes. The patient is not nervous/anxious.      CONSTITUTIONAL: NEGATIVE for fever, chills, change in weight  INTEGUMENTARU/SKIN: NEGATIVE for worrisome rashes, moles or lesions  EYES: NEGATIVE for vision changes or irritation  ENT: NEGATIVE for ear, mouth and throat problems  RESP: NEGATIVE for significant cough or SOB  CV: NEGATIVE for chest pain, palpitations or peripheral edema  GI: NEGATIVE for nausea, abdominal pain, heartburn, or change in bowel habits  : NEGATIVE for unusual urinary or vaginal symptoms. Periods are regular.  MUSCULOSKELETAL: NEGATIVE for significant arthralgias or myalgia  NEURO: NEGATIVE for weakness, dizziness or paresthesias  PSYCHIATRIC: NEGATIVE for changes in mood or affect    "  OBJECTIVE:   /74 (BP Location: Right arm, Patient Position: Chair, Cuff Size: Adult Regular)   Pulse 74   Temp 98.1  F (36.7  C) (Tympanic)   Resp 16   Ht 1.66 m (5' 5.35\")   Wt 63.7 kg (140 lb 8 oz)   LMP 08/17/2020 (Exact Date)   SpO2 100%   BMI 23.13 kg/m    Physical Exam  GENERAL: healthy, alert and no distress  EYES: Eyes grossly normal to inspection, PERRL and conjunctivae and sclerae normal  HENT: ear canals and TM's normal, nose and mouth without ulcers or lesions  NECK: no adenopathy, no asymmetry, masses, or scars and thyroid normal to palpation  RESP: lungs clear to auscultation - no rales, rhonchi or wheezes  CV: regular rate and rhythm, normal S1 S2, no S3 or S4, no murmur, click or rub, no peripheral edema and peripheral pulses strong  ABDOMEN: soft, nontender, no hepatosplenomegaly, no masses and bowel sounds normal  MS: no gross musculoskeletal defects noted, no edema  SKIN: no suspicious lesions or rashes  NEURO: Normal strength and tone, mentation intact and speech normal  PSYCH: mentation appears normal, affect normal/bright    Diagnostic Test Results:  Labs reviewed in Epic    ASSESSMENT/PLAN:   1. Routine general medical examination at a health care facility  Doing well, can work on diet/exercise for better blood pressure control, and increased HDL  - VACCINE ADMINISTRATION, INITIAL    2. Family history of diabetes mellitus  3. Family history of ischemic heart disease  Interested in trying physical therapy to develop a home exercise plan, to reduce blood pressure and increase HDL.  - PHYSICAL THERAPY REFERRAL; Future      COUNSELING:  Reviewed preventive health counseling, as reflected in patient instructions       Regular exercise       Healthy diet/nutrition       Immunizations    Vaccinated for: Influenza             Osteoporosis Prevention/Bone Health       Colon cancer screening       (Anais)menopause management    Estimated body mass index is 23.13 kg/m  as calculated from " "the following:    Height as of this encounter: 1.66 m (5' 5.35\").    Weight as of this encounter: 63.7 kg (140 lb 8 oz).        She reports that she has never smoked. She has never used smokeless tobacco.      Counseling Resources:  ATP IV Guidelines  Pooled Cohorts Equation Calculator  Breast Cancer Risk Calculator  BRCA-Related Cancer Risk Assessment: FHS-7 Tool  FRAX Risk Assessment  ICSI Preventive Guidelines  Dietary Guidelines for Americans, 2010  USDA's MyPlate  ASA Prophylaxis  Lung CA Screening    Kim Lama MD  [facility]  "

## 2021-01-22 ENCOUNTER — HOSPITAL ENCOUNTER (OUTPATIENT)
Dept: MAMMOGRAPHY | Facility: CLINIC | Age: 51
Discharge: HOME | End: 2021-01-22
Attending: INTERNAL MEDICINE | Admitting: INTERNAL MEDICINE
Payer: COMMERCIAL

## 2021-01-22 DIAGNOSIS — R92.8 ABNORMAL MAMMOGRAM: Primary | ICD-10-CM

## 2021-01-22 DIAGNOSIS — R92.8 ABNORMAL MAMMOGRAM: ICD-10-CM

## 2021-01-22 PROCEDURE — 77065 DX MAMMO INCL CAD UNI: CPT | Mod: RT

## 2021-06-07 ENCOUNTER — MYC MEDICAL ADVICE (OUTPATIENT)
Dept: PEDIATRICS | Facility: CLINIC | Age: 51
End: 2021-06-07

## 2021-07-07 ENCOUNTER — TELEPHONE (OUTPATIENT)
Dept: PEDIATRICS | Facility: CLINIC | Age: 51
End: 2021-07-07

## 2021-07-07 NOTE — TELEPHONE ENCOUNTER
Received call from pt she brought in her health care directive in the third week of June  She called medical records and they do not have it  Writer did not see if in the chart    MA/TC: Please check at the station for the paperwork  Please update pt .She is requesting a call back [phone]  Pt's 's Health care was dropped off the same time  See separate encounter  Pt is now seeing Dr. Lama     Thank you  Lucero Carter RN on 7/7/2021 at 10:00 AM

## 2021-07-08 NOTE — TELEPHONE ENCOUNTER
This writer checked inbasket, faxed, and abstracting at the station.  Documents were not found.  Called and spoke with patient.  Patient states forms were handed to .  Informed patient that there may be a delay with medical records getting scanned in.  Patient states she does have copies, and can drop off a copy if needed.  Informed patient I will check in with her next week to see if records show up in the chart, and proceed from there.  Patient is in agreement with this plan, and has direct number for questions or concerns.     Danna Plasencia

## 2021-07-14 ENCOUNTER — HOSPITAL ENCOUNTER (OUTPATIENT)
Dept: MAMMOGRAPHY | Facility: CLINIC | Age: 51
Discharge: HOME | End: 2021-07-14
Attending: INTERNAL MEDICINE | Admitting: INTERNAL MEDICINE
Payer: COMMERCIAL

## 2021-07-14 DIAGNOSIS — R92.8 ABNORMAL MAMMOGRAM: ICD-10-CM

## 2021-07-14 PROCEDURE — G0279 TOMOSYNTHESIS, MAMMO: HCPCS

## 2021-07-14 PROCEDURE — 77062 BREAST TOMOSYNTHESIS BI: CPT

## 2021-07-14 NOTE — TELEPHONE ENCOUNTER
Healthcare directive is still not scanned into patient's chart.  Left message for patient informing her of this. Asked patient if she could re-send via [employer] or drop off to clinic 2nd floor.  Informed patient if she does drop off to 2nd floor, she can ask this writer directly. Provided clinic number for questions or concerns.    Danna Plasencia

## 2021-07-20 ENCOUNTER — DOCUMENTATION ONLY (OUTPATIENT)
Dept: OTHER | Facility: CLINIC | Age: 51
End: 2021-07-20

## 2021-07-20 NOTE — TELEPHONE ENCOUNTER
Healthcare directive is now abstracted in patient's chart.  Called and informed patient.    Danna Plasencia

## 2021-08-05 ENCOUNTER — TELEPHONE (OUTPATIENT)
Dept: PEDIATRICS | Facility: CLINIC | Age: 51
End: 2021-08-05

## 2021-08-05 NOTE — TELEPHONE ENCOUNTER
Patient Quality Outreach      Summary:    Patient has the following on her problem list/HM:     Immunizations       Health Maintenance Due   Topic     Diptheria Tetanus Pertussis (DTAP/TDAP/TD) Vaccine (3 - Td or Tdap)         Patient is due/failing the following:   Cervical Cancer Screening - PAP Needed, Annual wellness, date due: 09/01/21 and Immunizations    Type of outreach:    Phone, left message for patient/parent to call back.    Questions for provider review:    None                                                                                                                                     Lamar Lyn CMA on 8/5/2021 at 1:19 PM

## 2021-09-07 ENCOUNTER — OFFICE VISIT (OUTPATIENT)
Dept: PEDIATRICS | Facility: CLINIC | Age: 51
End: 2021-09-07
Payer: COMMERCIAL

## 2021-09-07 VITALS
SYSTOLIC BLOOD PRESSURE: 142 MMHG | DIASTOLIC BLOOD PRESSURE: 86 MMHG | BODY MASS INDEX: 23.87 KG/M2 | TEMPERATURE: 98.3 F | OXYGEN SATURATION: 100 % | HEIGHT: 64 IN | HEART RATE: 77 BPM | WEIGHT: 139.8 LBS

## 2021-09-07 DIAGNOSIS — Z12.11 SCREEN FOR COLON CANCER: ICD-10-CM

## 2021-09-07 DIAGNOSIS — Z00.00 ENCOUNTER FOR PREVENTIVE HEALTH EXAMINATION: Primary | ICD-10-CM

## 2021-09-07 DIAGNOSIS — E55.9 VITAMIN D DEFICIENCY: ICD-10-CM

## 2021-09-07 DIAGNOSIS — Z12.4 SCREENING FOR MALIGNANT NEOPLASM OF CERVIX: ICD-10-CM

## 2021-09-07 DIAGNOSIS — R73.9 HYPERGLYCEMIA: ICD-10-CM

## 2021-09-07 DIAGNOSIS — Z11.59 NEED FOR HEPATITIS C SCREENING TEST: ICD-10-CM

## 2021-09-07 DIAGNOSIS — I10 HYPERTENSION, UNSPECIFIED TYPE: ICD-10-CM

## 2021-09-07 LAB — HBA1C MFR BLD: 5.4 % (ref 0–5.6)

## 2021-09-07 PROCEDURE — 90472 IMMUNIZATION ADMIN EACH ADD: CPT | Performed by: INTERNAL MEDICINE

## 2021-09-07 PROCEDURE — 82728 ASSAY OF FERRITIN: CPT | Performed by: INTERNAL MEDICINE

## 2021-09-07 PROCEDURE — 90750 HZV VACC RECOMBINANT IM: CPT | Performed by: INTERNAL MEDICINE

## 2021-09-07 PROCEDURE — 99396 PREV VISIT EST AGE 40-64: CPT | Mod: 25 | Performed by: INTERNAL MEDICINE

## 2021-09-07 PROCEDURE — 36415 COLL VENOUS BLD VENIPUNCTURE: CPT | Performed by: INTERNAL MEDICINE

## 2021-09-07 PROCEDURE — 87624 HPV HI-RISK TYP POOLED RSLT: CPT | Performed by: INTERNAL MEDICINE

## 2021-09-07 PROCEDURE — 90715 TDAP VACCINE 7 YRS/> IM: CPT | Performed by: INTERNAL MEDICINE

## 2021-09-07 PROCEDURE — 99213 OFFICE O/P EST LOW 20 MIN: CPT | Mod: 25 | Performed by: INTERNAL MEDICINE

## 2021-09-07 PROCEDURE — 83036 HEMOGLOBIN GLYCOSYLATED A1C: CPT | Performed by: INTERNAL MEDICINE

## 2021-09-07 PROCEDURE — 82274 ASSAY TEST FOR BLOOD FECAL: CPT | Performed by: INTERNAL MEDICINE

## 2021-09-07 PROCEDURE — G0145 SCR C/V CYTO,THINLAYER,RESCR: HCPCS | Performed by: INTERNAL MEDICINE

## 2021-09-07 PROCEDURE — 90471 IMMUNIZATION ADMIN: CPT | Performed by: INTERNAL MEDICINE

## 2021-09-07 PROCEDURE — 86803 HEPATITIS C AB TEST: CPT | Performed by: INTERNAL MEDICINE

## 2021-09-07 PROCEDURE — 82306 VITAMIN D 25 HYDROXY: CPT | Performed by: INTERNAL MEDICINE

## 2021-09-07 ASSESSMENT — ENCOUNTER SYMPTOMS
FREQUENCY: 0
PARESTHESIAS: 0
HEADACHES: 0
JOINT SWELLING: 0
DIZZINESS: 0
NAUSEA: 0
PALPITATIONS: 0
CONSTIPATION: 0
HEARTBURN: 0
HEMATURIA: 0
MYALGIAS: 0
FEVER: 0
SHORTNESS OF BREATH: 0
DIARRHEA: 0
SORE THROAT: 0
EYE PAIN: 0
COUGH: 0
WEAKNESS: 0
NERVOUS/ANXIOUS: 0
CHILLS: 0
HEMATOCHEZIA: 0
DYSURIA: 0
ABDOMINAL PAIN: 0
ARTHRALGIAS: 0
BREAST MASS: 0

## 2021-09-07 ASSESSMENT — MIFFLIN-ST. JEOR: SCORE: 1245

## 2021-09-07 NOTE — PROGRESS NOTES
SUBJECTIVE:   CC: Carie Hi is an 51 year old woman who presents for preventive health visit.     Patient has been advised of split billing requirements and indicates understanding: Yes  Healthy Habits:     Getting at least 3 servings of Calcium per day:  Yes    Bi-annual eye exam:  Yes    Dental care twice a year:  Yes    Sleep apnea or symptoms of sleep apnea:  None    Diet:  Vegetarian/vegan    Frequency of exercise:  2-3 days/week    Duration of exercise:  45-60 minutes    Taking medications regularly:  No    Barriers to taking medications:  Not applicable    Medication side effects:  Not applicable    PHQ-2 Total Score: 0    Additional concerns today:  No    Carie is here for a preventive health visit.  Overall, she is doing well with the following concerns:     1. Mother has diabetes. Wondering if she should have screening.     2. Blood pressure has been up. Has a log from Feb-April 2021 and readings are mostly 150s/80s, some up to 170s/90s, very few even in high 130s, none 120s. She wonders if her monitor is off, if she should have it checked. Reluctant to start antihypertensive medication.     3. Healthy diet. Vegetarian. History of iron deficiency. Not recently checked.     Due for a pap today.     Colon cancer screening -has a very sensitive stomach, worried about colonoscopy prep, prefers to do FIT testing now.     No menopausal symptoms. Very regular periods.         Today's PHQ-2 Score:   PHQ-2 ( 1999 Pfizer) 9/7/2021   Q1: Little interest or pleasure in doing things 0   Q2: Feeling down, depressed or hopeless 0   PHQ-2 Score 0   Q1: Little interest or pleasure in doing things Not at all   Q2: Feeling down, depressed or hopeless Not at all   PHQ-2 Score 0       Abuse: Current or Past (Physical, Sexual or Emotional) - No  Do you feel safe in your environment? Yes        Social History     Tobacco Use     Smoking status: Never Smoker     Smokeless tobacco: Never Used   Substance Use Topics      Alcohol use: No         Alcohol Use 9/7/2021   Prescreen: >3 drinks/day or >7 drinks/week? Not Applicable   Prescreen: >3 drinks/day or >7 drinks/week? -       Reviewed orders with patient.  Reviewed health maintenance and updated orders accordingly - Yes      Breast Cancer Screening:    Breast CA Risk Assessment (FHS-7) 9/7/2021   Do you have a family history of breast, colon, or ovarian cancer? No / Unknown         Mammogram Screening: Recommended annual mammography  Pertinent mammograms are reviewed under the imaging tab.    History of abnormal Pap smear: NO - age 30-65 PAP every 5 years with negative HPV co-testing recommended  PAP / HPV Latest Ref Rng & Units 2/16/2016 4/15/2011 4/25/2008   PAP (Historical) - NIL NIL NIL   HPV16 NEG Negative - -   HPV18 NEG Negative - -   HRHPV NEG Negative - -     Reviewed and updated as needed this visit by clinical staff  Tobacco  Allergies  Meds  Problems  Med Hx  Surg Hx  Fam Hx          Reviewed and updated as needed this visit by Provider  Tobacco  Allergies  Meds  Problems  Med Hx  Surg Hx  Fam Hx             Review of Systems   Constitutional: Negative for chills and fever.   HENT: Negative for congestion, ear pain, hearing loss and sore throat.    Eyes: Negative for pain and visual disturbance.   Respiratory: Negative for cough and shortness of breath.    Cardiovascular: Negative for chest pain, palpitations and peripheral edema.   Gastrointestinal: Negative for abdominal pain, constipation, diarrhea, heartburn, hematochezia and nausea.   Breasts:  Negative for tenderness, breast mass and discharge.   Genitourinary: Negative for dysuria, frequency, genital sores, hematuria, pelvic pain, urgency, vaginal bleeding and vaginal discharge.   Musculoskeletal: Negative for arthralgias, joint swelling and myalgias.   Skin: Negative for rash.   Neurological: Negative for dizziness, weakness, headaches and paresthesias.   Psychiatric/Behavioral: Negative for mood  "changes. The patient is not nervous/anxious.         OBJECTIVE:   BP (!) 142/86 (BP Location: Right arm, Patient Position: Chair, Cuff Size: Adult Regular)   Pulse 77   Temp 98.3  F (36.8  C) (Tympanic)   Ht 1.635 m (5' 4.37\")   Wt 63.4 kg (139 lb 12.8 oz)   SpO2 100%   BMI 23.72 kg/m    Physical Exam  GENERAL: healthy, alert and no distress  EYES: Eyes grossly normal to inspection, PERRL and conjunctivae and sclerae normal  HENT: ear canals and TM's normal, nose and mouth without ulcers or lesions  NECK: no adenopathy, no asymmetry, masses, or scars and thyroid normal to palpation  RESP: lungs clear to auscultation - no rales, rhonchi or wheezes  CV: regular rate and rhythm, normal S1 S2, no S3 or S4, no murmur, click or rub, no peripheral edema and peripheral pulses strong  ABDOMEN: soft, nontender, no hepatosplenomegaly, no masses and bowel sounds normal   (female): normal female external genitalia, normal urethral meatus, vaginal mucosa pink, moist, well rugated, and normal cervix/adnexa/uterus without masses or discharge  MS: no gross musculoskeletal defects noted, no edema  SKIN: no suspicious lesions or rashes  NEURO: Normal strength and tone, mentation intact and speech normal  PSYCH: mentation appears normal, affect normal/bright    Diagnostic Test Results:  Labs reviewed in Epic    ASSESSMENT/PLAN:   (Z00.00) Encounter for preventive health examination  (primary encounter diagnosis)  Comment: Doing well, up to date on screening.   Plan: Ferritin            (Z12.11) Screen for colon cancer  Comment: Prefers FIT testing currently. Will consider colonosocpy in the future.   Plan: Fecal colorectal cancer screen (FIT)            (Z11.59) Need for hepatitis C screening test  Comment: Routine screening.  Low risk.   Plan: Hepatitis C Screen Reflex to HCV RNA Quant and         Genotype            (Z12.4) Screening for malignant neoplasm of cervix  Comment: Routine screening.  Low risk.    Plan: Pap Screen " "with HPV - recommended age 30 - 65         years, HPV Hold (Lab Only)            (R73.9) Hyperglycemia  Comment: With family history of diabetes  Plan: Hemoglobin A1c            (E55.9) Vitamin D deficiency  Comment: not currently taking a supplement.   Plan: Vitamin D Deficiency            (I10) Hypertension, unspecified type  Comment: willing to come in for nurse visit to check BP against her home monitor, discussed likely starting lisinopril at 10 mg daily.     Patient has been advised of split billing requirements and indicates understanding: Yes  COUNSELING:  Reviewed preventive health counseling, as reflected in patient instructions       Regular exercise       Healthy diet/nutrition       Consider Hep C screening for all patients one time for ages 18-79 years       HIV screeninx in teen years, 1x in adult years, and at intervals if high risk       (Anais)menopause management    Estimated body mass index is 23.72 kg/m  as calculated from the following:    Height as of this encounter: 1.635 m (5' 4.37\").    Weight as of this encounter: 63.4 kg (139 lb 12.8 oz).        She reports that she has never smoked. She has never used smokeless tobacco.      Counseling Resources:  ATP IV Guidelines  Pooled Cohorts Equation Calculator  Breast Cancer Risk Calculator  BRCA-Related Cancer Risk Assessment: FHS-7 Tool  FRAX Risk Assessment  ICSI Preventive Guidelines  Dietary Guidelines for Americans, 2010  USDA's MyPlate  ASA Prophylaxis  Lung CA Screening    Kim Lama MD  [facility]  "

## 2021-09-07 NOTE — PATIENT INSTRUCTIONS
For your blood pressure, I would start lisinopril 10 mg daily.     You can make a nurse visit to check your machine (appt needed). Pharmacist can also check blood pressure with no appointment.

## 2021-09-08 ENCOUNTER — ALLIED HEALTH/NURSE VISIT (OUTPATIENT)
Dept: PEDIATRICS | Facility: CLINIC | Age: 51
End: 2021-09-08
Payer: COMMERCIAL

## 2021-09-08 VITALS — DIASTOLIC BLOOD PRESSURE: 70 MMHG | SYSTOLIC BLOOD PRESSURE: 170 MMHG | HEART RATE: 100 BPM

## 2021-09-08 DIAGNOSIS — R03.0 ELEVATED BP WITHOUT DIAGNOSIS OF HYPERTENSION: Primary | ICD-10-CM

## 2021-09-08 LAB
DEPRECATED CALCIDIOL+CALCIFEROL SERPL-MC: 13 UG/L (ref 20–75)
FERRITIN SERPL-MCNC: 8 NG/ML (ref 8–252)
HCV AB SERPL QL IA: NONREACTIVE

## 2021-09-08 PROCEDURE — 99207 PR NO CHARGE NURSE ONLY: CPT

## 2021-09-08 NOTE — PROGRESS NOTES
Patient comparing home BP machine to ours.  Maria T Wilkins MA    Carie Hi is a 51 year old patient who comes in today for a Blood Pressure check.  Initial BP:  There were no vitals taken for this visit.     Data Unavailable  Disposition: results routed to provider / discussed

## 2021-09-10 ENCOUNTER — ALLIED HEALTH/NURSE VISIT (OUTPATIENT)
Dept: PEDIATRICS | Facility: CLINIC | Age: 51
End: 2021-09-10
Payer: COMMERCIAL

## 2021-09-10 DIAGNOSIS — Z01.30 BP CHECK: Primary | ICD-10-CM

## 2021-09-10 LAB
BKR LAB AP GYN ADEQUACY: NORMAL
BKR LAB AP GYN INTERPRETATION: NORMAL
BKR LAB AP HPV REFLEX: NORMAL
BKR LAB AP PREVIOUS ABNORMAL: NORMAL
PATH REPORT.COMMENTS IMP SPEC: NORMAL
PATH REPORT.RELEVANT HX SPEC: NORMAL

## 2021-09-10 PROCEDURE — 99207 PR NO CHARGE NURSE ONLY: CPT | Performed by: INTERNAL MEDICINE

## 2021-09-10 NOTE — PROGRESS NOTES
Carie Hi was evaluated at [facility] on September 10, 2021 at which time her blood pressure was:    BP Readings from Last 3 Encounters:   09/08/21 (!) 170/70   09/07/21 (!) 142/86   09/01/20 138/74     Pulse Readings from Last 3 Encounters:   09/08/21 100   09/07/21 77   09/01/20 74       Reviewed lifestyle modifications for blood pressure control and reduction: including making healthy food choices, managing weight, getting regular exercise, smoking cessation, reducing alcohol consumption, monitoring blood pressure regularly.     Symptoms: None    BP Goal:< 140/90 mmHg    BP Assessment:  BP at goal    Potential Reasons for BP too high: NA - Not applicable    BP Follow-Up Plan: Recheck BP in 30 days at pharmacy    Recommendation to Provider: pt in pharmacy for BP check per MD recommendation. Not currently taking any HBP medication however BP was elevated at clinic appointment about 1 week ago. Has used home monitor in the past (February to May 2021 was taking regularly) however now not using home machine. No symptoms reported. She is going to come a couple more times to the pharmacy for BP checks in the next week.     Note completed by:   Ching Shannon, PharmD  [facility] [place]  [phone]

## 2021-09-11 ENCOUNTER — ALLIED HEALTH/NURSE VISIT (OUTPATIENT)
Dept: PEDIATRICS | Facility: CLINIC | Age: 51
End: 2021-09-11
Payer: COMMERCIAL

## 2021-09-11 VITALS — DIASTOLIC BLOOD PRESSURE: 74 MMHG | SYSTOLIC BLOOD PRESSURE: 136 MMHG

## 2021-09-11 DIAGNOSIS — Z01.30 BP CHECK: Primary | ICD-10-CM

## 2021-09-11 PROCEDURE — 99207 PR NO CHARGE NURSE ONLY: CPT | Performed by: INTERNAL MEDICINE

## 2021-09-11 NOTE — PROGRESS NOTES
Carie Hi was evaluated at [facility] on September 11, 2021 at which time her blood pressure was:    BP Readings from Last 3 Encounters:   09/11/21 136/74   09/08/21 (!) 170/70   09/07/21 (!) 142/86     Pulse Readings from Last 3 Encounters:   09/08/21 100   09/07/21 77   09/01/20 74       Reviewed lifestyle modifications for blood pressure control and reduction: including making healthy food choices, managing weight, getting regular exercise, smoking cessation, reducing alcohol consumption, monitoring blood pressure regularly.     Symptoms: None    BP Goal:< 140/90 mmHg    BP Assessment:  BP at goal (upon recheck)    Potential Reasons for BP too high: NA - Not applicable    BP Follow-Up Plan: Other: BP recheck next week    Recommendation to Provider: Continue to closely monitor    Note completed by: Adriane Gabriel, Pharm.D., BCACP   Medication Therapy Management Pharmacist   [facility]

## 2021-09-14 LAB
HUMAN PAPILLOMA VIRUS 16 DNA: NEGATIVE
HUMAN PAPILLOMA VIRUS 18 DNA: NEGATIVE
HUMAN PAPILLOMA VIRUS FINAL DIAGNOSIS: NORMAL
HUMAN PAPILLOMA VIRUS OTHER HR: NEGATIVE

## 2021-09-15 ENCOUNTER — ALLIED HEALTH/NURSE VISIT (OUTPATIENT)
Dept: PEDIATRICS | Facility: CLINIC | Age: 51
End: 2021-09-15
Payer: COMMERCIAL

## 2021-09-15 ENCOUNTER — ALLIED HEALTH/NURSE VISIT (OUTPATIENT)
Dept: PEDIATRICS | Facility: CLINIC | Age: 51
End: 2021-09-15

## 2021-09-15 VITALS — DIASTOLIC BLOOD PRESSURE: 78 MMHG | SYSTOLIC BLOOD PRESSURE: 138 MMHG

## 2021-09-15 DIAGNOSIS — Z01.30 BP CHECK: Primary | ICD-10-CM

## 2021-09-15 LAB — HEMOCCULT STL QL IA: NEGATIVE

## 2021-09-15 PROCEDURE — 99207 PR NO CHARGE NURSE ONLY: CPT | Performed by: INTERNAL MEDICINE

## 2021-09-15 NOTE — PROGRESS NOTES
Carie Hi was evaluated at [facility] on September 15, 2021 at which time her blood pressure was:    BP Readings from Last 3 Encounters:   09/15/21 138/78   09/14/21 138/78   09/11/21 136/74     Pulse Readings from Last 3 Encounters:   09/08/21 100   09/07/21 77   09/01/20 74       Reviewed lifestyle modifications for blood pressure control and reduction: including making healthy food choices, managing weight, getting regular exercise, smoking cessation, reducing alcohol consumption, monitoring blood pressure regularly.     Symptoms: None    BP Goal:< 140/90 mmHg    BP Assessment:  BP at goal    Potential Reasons for BP too high: NA - Not applicable    BP Follow-Up Plan: Recheck BP in 30 days at pharmacy    Recommendation to Provider: pt in for another check this afternoon. Will continue to monitor for 2 more times per patient and then will touch base with MD about if need to continue. No side effects/symptoms reported     Note completed by:   Ching Shannon, [place]mero  [facility] [place]  [phone]

## 2021-09-15 NOTE — PROGRESS NOTES
Carie Hi was evaluated at [facility] on September 15, 2021 at which time her blood pressure was:    BP Readings from Last 3 Encounters:   09/14/21 138/78   09/11/21 136/74   09/08/21 (!) 170/70     Pulse Readings from Last 3 Encounters:   09/08/21 100   09/07/21 77   09/01/20 74       Reviewed lifestyle modifications for blood pressure control and reduction: including making healthy food choices, managing weight, getting regular exercise, smoking cessation, reducing alcohol consumption, monitoring blood pressure regularly.     Symptoms: None    BP Goal:< 140/90 mmHg    BP Assessment:  BP at goal    Potential Reasons for BP too high: NA - Not applicable    BP Follow-Up Plan: Recheck BP in 30 days at pharmacy    Recommendation to Provider: pt in for another BP check in pharmacy. She is monitoring for a couple weeks due to an elevated BP in clinic. She is not currently taking HBP medications. She plans on coming a couple more times next week for BP readings.     Note completed by:   Ching Shannon, Omero  [facility] [place]  [phone]

## 2021-09-16 ENCOUNTER — MYC MEDICAL ADVICE (OUTPATIENT)
Dept: PEDIATRICS | Facility: CLINIC | Age: 51
End: 2021-09-16

## 2021-09-17 ENCOUNTER — ALLIED HEALTH/NURSE VISIT (OUTPATIENT)
Dept: PEDIATRICS | Facility: CLINIC | Age: 51
End: 2021-09-17
Payer: COMMERCIAL

## 2021-09-17 VITALS — DIASTOLIC BLOOD PRESSURE: 72 MMHG | SYSTOLIC BLOOD PRESSURE: 136 MMHG

## 2021-09-17 DIAGNOSIS — Z01.30 BP CHECK: Primary | ICD-10-CM

## 2021-09-17 PROCEDURE — 99207 PR NO CHARGE NURSE ONLY: CPT | Performed by: INTERNAL MEDICINE

## 2021-09-17 NOTE — PROGRESS NOTES
Carie Hi was evaluated at [facility] on September 17, 2021 at which time her blood pressure was:    BP Readings from Last 3 Encounters:   09/16/21 136/72   09/15/21 138/78   09/14/21 138/78     Pulse Readings from Last 3 Encounters:   09/08/21 100   09/07/21 77   09/01/20 74       Reviewed lifestyle modifications for blood pressure control and reduction: including making healthy food choices, managing weight, getting regular exercise, smoking cessation, reducing alcohol consumption, monitoring blood pressure regularly.     Symptoms: None    BP Goal:< 140/90 mmHg    BP Assessment:  BP at goal    Potential Reasons for BP too high: NA - Not applicable    BP Follow-Up Plan: Recheck BP in 30 days at pharmacy    Recommendation to Provider: pt in for another BP check. Pt has been checking over the last 1 to 2 weeks often. Sounds like Friday 9/17 she will check for the last time and check with MD about plan. So far all readings have been below 140/90.    Note completed by:   Ching Shannon, Omero  [facility]  [phone]

## 2021-09-17 NOTE — TELEPHONE ENCOUNTER
Dr. Lama,    Please see  message and advise with any recommendations    Thank you  Lucero Carter RN on 9/17/2021 at 8:36 AM

## 2021-09-19 ENCOUNTER — HEALTH MAINTENANCE LETTER (OUTPATIENT)
Age: 51
End: 2021-09-19

## 2021-09-20 ENCOUNTER — ALLIED HEALTH/NURSE VISIT (OUTPATIENT)
Dept: PEDIATRICS | Facility: CLINIC | Age: 51
End: 2021-09-20
Payer: COMMERCIAL

## 2021-09-20 ENCOUNTER — MYC MEDICAL ADVICE (OUTPATIENT)
Dept: PEDIATRICS | Facility: CLINIC | Age: 51
End: 2021-09-20

## 2021-09-20 VITALS — DIASTOLIC BLOOD PRESSURE: 70 MMHG | SYSTOLIC BLOOD PRESSURE: 136 MMHG

## 2021-09-20 DIAGNOSIS — Z01.30 BP CHECK: Primary | ICD-10-CM

## 2021-09-20 PROCEDURE — 99207 PR NO CHARGE NURSE ONLY: CPT | Performed by: INTERNAL MEDICINE

## 2021-09-20 NOTE — PROGRESS NOTES
Carie Hi was evaluated at [facility] on September 20, 2021 at which time her blood pressure was:    BP Readings from Last 3 Encounters:   09/20/21 136/70   09/16/21 136/72   09/15/21 138/78     Pulse Readings from Last 3 Encounters:   09/08/21 100   09/07/21 77   09/01/20 74       Reviewed lifestyle modifications for blood pressure control and reduction: including making healthy food choices, managing weight, getting regular exercise, smoking cessation, reducing alcohol consumption, monitoring blood pressure regularly.     Symptoms: None    BP Goal:< 140/90 mmHg    BP Assessment:  BP at goal    Potential Reasons for BP too high: NA - Not applicable    BP Follow-Up Plan: Recheck BP in 30 days at pharmacy    Recommendation to Provider: pt in pharmacy today for another BP check. No changes since last week. todays reading was 136/70. Seems to be very stable/consistent across the last couple weeks/each time patient has been into pharmacy.     Note completed by:   Ching Shannon PharmD  [facility]  [phone]

## 2021-10-26 ENCOUNTER — MYC MEDICAL ADVICE (OUTPATIENT)
Dept: PEDIATRICS | Facility: CLINIC | Age: 51
End: 2021-10-26

## 2022-05-13 ENCOUNTER — ALLIED HEALTH/NURSE VISIT (OUTPATIENT)
Dept: PEDIATRICS | Facility: CLINIC | Age: 52
End: 2022-05-13
Payer: COMMERCIAL

## 2022-05-13 VITALS — DIASTOLIC BLOOD PRESSURE: 78 MMHG | SYSTOLIC BLOOD PRESSURE: 136 MMHG

## 2022-05-13 DIAGNOSIS — Z01.30 BP CHECK: Primary | ICD-10-CM

## 2022-05-13 PROCEDURE — 99207 PR NO CHARGE NURSE ONLY: CPT | Performed by: INTERNAL MEDICINE

## 2022-05-13 NOTE — PROGRESS NOTES
Carie Hi was evaluated at [facility] on May 13, 2022 at which time her blood pressure was:    BP Readings from Last 3 Encounters:   05/13/22 136/78   09/20/21 136/70   09/16/21 136/72     Pulse Readings from Last 3 Encounters:   09/08/21 100   09/07/21 77   09/01/20 74       Reviewed lifestyle modifications for blood pressure control and reduction: including making healthy food choices, managing weight, getting regular exercise, smoking cessation, reducing alcohol consumption, monitoring blood pressure regularly.     Symptoms: None    BP Goal:< 140/90 mmHg    BP Assessment:  BP at goal    Potential Reasons for BP too high: NA - Not applicable    BP Follow-Up Plan: Recheck BP in 6 months at pharmacy    Recommendation to Provider: pt in pharmacy for BP check. Not currently taking HBP medication but working at exercise and lifestyle changes. No reported symptoms.    Note completed by:   Ching Shannon, PharmD  [facility]  [phone]

## 2022-07-21 ENCOUNTER — HOSPITAL ENCOUNTER (OUTPATIENT)
Dept: MAMMOGRAPHY | Facility: CLINIC | Age: 52
Discharge: HOME | End: 2022-07-21
Attending: INTERNAL MEDICINE | Admitting: INTERNAL MEDICINE
Payer: COMMERCIAL

## 2022-07-21 DIAGNOSIS — R92.1 BREAST CALCIFICATIONS: ICD-10-CM

## 2022-07-21 PROCEDURE — 77066 DX MAMMO INCL CAD BI: CPT

## 2022-09-13 ENCOUNTER — OFFICE VISIT (OUTPATIENT)
Dept: PEDIATRICS | Facility: CLINIC | Age: 52
End: 2022-09-13
Payer: COMMERCIAL

## 2022-09-13 VITALS
OXYGEN SATURATION: 100 % | BODY MASS INDEX: 24.38 KG/M2 | WEIGHT: 142.8 LBS | SYSTOLIC BLOOD PRESSURE: 160 MMHG | RESPIRATION RATE: 16 BRPM | DIASTOLIC BLOOD PRESSURE: 80 MMHG | TEMPERATURE: 98 F | HEIGHT: 64 IN | HEART RATE: 77 BPM

## 2022-09-13 DIAGNOSIS — E55.9 VITAMIN D DEFICIENCY: ICD-10-CM

## 2022-09-13 DIAGNOSIS — Z00.00 ROUTINE GENERAL MEDICAL EXAMINATION AT A HEALTH CARE FACILITY: Primary | ICD-10-CM

## 2022-09-13 DIAGNOSIS — Z12.11 SCREEN FOR COLON CANCER: ICD-10-CM

## 2022-09-13 DIAGNOSIS — E61.1 IRON DEFICIENCY: ICD-10-CM

## 2022-09-13 DIAGNOSIS — R03.0 WHITE COAT SYNDROME WITHOUT DIAGNOSIS OF HYPERTENSION: ICD-10-CM

## 2022-09-13 LAB
ERYTHROCYTE [DISTWIDTH] IN BLOOD BY AUTOMATED COUNT: 12.1 % (ref 10–15)
HBA1C MFR BLD: 5.3 % (ref 0–5.6)
HCT VFR BLD AUTO: 38.2 % (ref 35–47)
HGB BLD-MCNC: 12.9 G/DL (ref 11.7–15.7)
MCH RBC QN AUTO: 31.2 PG (ref 26.5–33)
MCHC RBC AUTO-ENTMCNC: 33.8 G/DL (ref 31.5–36.5)
MCV RBC AUTO: 93 FL (ref 78–100)
PLATELET # BLD AUTO: 197 10E3/UL (ref 150–450)
RBC # BLD AUTO: 4.13 10E6/UL (ref 3.8–5.2)
WBC # BLD AUTO: 6.3 10E3/UL (ref 4–11)

## 2022-09-13 PROCEDURE — 90750 HZV VACC RECOMBINANT IM: CPT | Performed by: INTERNAL MEDICINE

## 2022-09-13 PROCEDURE — 82728 ASSAY OF FERRITIN: CPT | Performed by: INTERNAL MEDICINE

## 2022-09-13 PROCEDURE — 85027 COMPLETE CBC AUTOMATED: CPT | Performed by: INTERNAL MEDICINE

## 2022-09-13 PROCEDURE — 99213 OFFICE O/P EST LOW 20 MIN: CPT | Mod: 25 | Performed by: INTERNAL MEDICINE

## 2022-09-13 PROCEDURE — 36415 COLL VENOUS BLD VENIPUNCTURE: CPT | Performed by: INTERNAL MEDICINE

## 2022-09-13 PROCEDURE — 83036 HEMOGLOBIN GLYCOSYLATED A1C: CPT | Performed by: INTERNAL MEDICINE

## 2022-09-13 PROCEDURE — 82306 VITAMIN D 25 HYDROXY: CPT | Performed by: INTERNAL MEDICINE

## 2022-09-13 PROCEDURE — 83550 IRON BINDING TEST: CPT | Performed by: INTERNAL MEDICINE

## 2022-09-13 PROCEDURE — 99396 PREV VISIT EST AGE 40-64: CPT | Mod: 25 | Performed by: INTERNAL MEDICINE

## 2022-09-13 PROCEDURE — 90471 IMMUNIZATION ADMIN: CPT | Performed by: INTERNAL MEDICINE

## 2022-09-13 SDOH — ECONOMIC STABILITY: FOOD INSECURITY: WITHIN THE PAST 12 MONTHS, YOU WORRIED THAT YOUR FOOD WOULD RUN OUT BEFORE YOU GOT MONEY TO BUY MORE.: NEVER TRUE

## 2022-09-13 SDOH — HEALTH STABILITY: PHYSICAL HEALTH: ON AVERAGE, HOW MANY DAYS PER WEEK DO YOU ENGAGE IN MODERATE TO STRENUOUS EXERCISE (LIKE A BRISK WALK)?: 5 DAYS

## 2022-09-13 SDOH — ECONOMIC STABILITY: TRANSPORTATION INSECURITY
IN THE PAST 12 MONTHS, HAS LACK OF TRANSPORTATION KEPT YOU FROM MEETINGS, WORK, OR FROM GETTING THINGS NEEDED FOR DAILY LIVING?: NO

## 2022-09-13 SDOH — ECONOMIC STABILITY: FOOD INSECURITY: WITHIN THE PAST 12 MONTHS, THE FOOD YOU BOUGHT JUST DIDN'T LAST AND YOU DIDN'T HAVE MONEY TO GET MORE.: NEVER TRUE

## 2022-09-13 SDOH — HEALTH STABILITY: PHYSICAL HEALTH: ON AVERAGE, HOW MANY MINUTES DO YOU ENGAGE IN EXERCISE AT THIS LEVEL?: 50 MIN

## 2022-09-13 SDOH — ECONOMIC STABILITY: INCOME INSECURITY: IN THE LAST 12 MONTHS, WAS THERE A TIME WHEN YOU WERE NOT ABLE TO PAY THE MORTGAGE OR RENT ON TIME?: NO

## 2022-09-13 SDOH — ECONOMIC STABILITY: TRANSPORTATION INSECURITY
IN THE PAST 12 MONTHS, HAS THE LACK OF TRANSPORTATION KEPT YOU FROM MEDICAL APPOINTMENTS OR FROM GETTING MEDICATIONS?: NO

## 2022-09-13 SDOH — ECONOMIC STABILITY: INCOME INSECURITY: HOW HARD IS IT FOR YOU TO PAY FOR THE VERY BASICS LIKE FOOD, HOUSING, MEDICAL CARE, AND HEATING?: NOT HARD AT ALL

## 2022-09-13 ASSESSMENT — ENCOUNTER SYMPTOMS
EYE PAIN: 0
HEADACHES: 0
MYALGIAS: 0
DIARRHEA: 0
ARTHRALGIAS: 0
PARESTHESIAS: 0
JOINT SWELLING: 0
CHILLS: 0
PALPITATIONS: 0
FREQUENCY: 0
SHORTNESS OF BREATH: 0
CONSTIPATION: 0
FEVER: 0
HEMATOCHEZIA: 0
DYSURIA: 0
ABDOMINAL PAIN: 0
BREAST MASS: 0
COUGH: 0
DIZZINESS: 0
HEMATURIA: 0
NAUSEA: 0
WEAKNESS: 0
NERVOUS/ANXIOUS: 0
SORE THROAT: 0
HEARTBURN: 0

## 2022-09-13 ASSESSMENT — LIFESTYLE VARIABLES
HOW OFTEN DO YOU HAVE A DRINK CONTAINING ALCOHOL: MONTHLY OR LESS
HOW MANY STANDARD DRINKS CONTAINING ALCOHOL DO YOU HAVE ON A TYPICAL DAY: 1 OR 2
SKIP TO QUESTIONS 9-10: 1
HOW OFTEN DO YOU HAVE SIX OR MORE DRINKS ON ONE OCCASION: NEVER
AUDIT-C TOTAL SCORE: 1

## 2022-09-13 ASSESSMENT — SOCIAL DETERMINANTS OF HEALTH (SDOH)
IN A TYPICAL WEEK, HOW MANY TIMES DO YOU TALK ON THE PHONE WITH FAMILY, FRIENDS, OR NEIGHBORS?: ONCE A WEEK
HOW OFTEN DO YOU GET TOGETHER WITH FRIENDS OR RELATIVES?: ONCE A WEEK
DO YOU BELONG TO ANY CLUBS OR ORGANIZATIONS SUCH AS CHURCH GROUPS UNIONS, FRATERNAL OR ATHLETIC GROUPS, OR SCHOOL GROUPS?: YES
HOW OFTEN DO YOU ATTEND CHURCH OR RELIGIOUS SERVICES?: NEVER

## 2022-09-13 ASSESSMENT — PAIN SCALES - GENERAL: PAINLEVEL: NO PAIN (0)

## 2022-09-13 NOTE — PROGRESS NOTES
SUBJECTIVE:   CC: Carie is an 52 year old who presents for preventive health visit.     {Split Bill scripting  The purpose of this visit is to discuss your medical history and prevent health problems before you are sick. You may be responsible for a co-pay, coinsurance, or deductible if your visit today includes services such as checking on a sore throat, having an x-ray or lab test, or treating and evaluating a new or existing condition :746751}  {Patient advised of split billing (Optional):832425}  Healthy Habits:     Getting at least 3 servings of Calcium per day:  Yes    Bi-annual eye exam:  Yes    Dental care twice a year:  Yes    Sleep apnea or symptoms of sleep apnea:  None    Diet:  Vegetarian/vegan    Frequency of exercise:  4-5 days/week    Duration of exercise:  45-60 minutes    Taking medications regularly:  Yes    Medication side effects:  Not applicable    PHQ-2 Total Score: 0    Additional concerns today:  No    {Add if <65 person on Medicare  - Required Questions (Optional):927722}  {Outside tests to abstract? :379784}    {additional problems to add (Optional):841394}    Today's PHQ-2 Score:   PHQ-2 ( 1999 Pfizer) 9/13/2022   Q1: Little interest or pleasure in doing things 0   Q2: Feeling down, depressed or hopeless 0   PHQ-2 Score 0   PHQ-2 Total Score (12-17 Years)- Positive if 3 or more points; Administer PHQ-A if positive -   Q1: Little interest or pleasure in doing things Not at all   Q2: Feeling down, depressed or hopeless Not at all   PHQ-2 Score 0       Abuse: Current or Past (Physical, Sexual or Emotional) - { :439673}  Do you feel safe in your environment? { :137083}        Social History     Tobacco Use     Smoking status: Never Smoker     Smokeless tobacco: Never Used   Substance Use Topics     Alcohol use: No     {Rooming Staff- Complete this question if Prescreen response is not shown below for today's visit. If you drink alcohol do you typically have >3 drinks per day or >7 drinks  "per week? (Optional):954826}    Alcohol Use 9/13/2022   Prescreen: >3 drinks/day or >7 drinks/week? No   Prescreen: >3 drinks/day or >7 drinks/week? -   {add AUDIT responses (Optional) (A score of 7 for adult men is an indication of hazardous drinking; a score of 8 or more is an indication of an alcohol use disorder.  A score of 7 or more for adult women is an indication of hazardous drinking or an alchohol use disorder):715800}    Reviewed orders with patient.  Reviewed health maintenance and updated orders accordingly - { :201600::\"Yes\"}  {Chronicprobdata (optional):828516}    Breast Cancer Screening:    Breast CA Risk Assessment (FHS-7) 9/7/2021   Do you have a family history of breast, colon, or ovarian cancer? No / Unknown       {If any of the questions to the BCRA (FHS-7) are answered yes, consider ordering referral for genetic counseling (Optional) :097302::\"click delete button to remove this line now\"}  {AMB Mammogram Decision Support (Optional) :088967}  Pertinent mammograms are reviewed under the imaging tab.    History of abnormal Pap smear: { :674397}  PAP / HPV Latest Ref Rng & Units 9/7/2021 2/16/2016 4/15/2011   PAP   Negative for Intraepithelial Lesion or Malignancy (NILM) - -   PAP (Historical) - - NIL NIL   HPV16 Negative Negative Negative -   HPV18 Negative Negative Negative -   HRHPV Negative Negative Negative -     Reviewed and updated as needed this visit by clinical staff                    Reviewed and updated as needed this visit by Provider                   {HISTORY OPTIONS (Optional):314878}    Review of Systems   Constitutional: Negative for chills and fever.   HENT: Negative for congestion, ear pain, hearing loss and sore throat.    Eyes: Negative for pain and visual disturbance.   Respiratory: Negative for cough and shortness of breath.    Cardiovascular: Negative for chest pain, palpitations and peripheral edema.   Gastrointestinal: Negative for abdominal pain, constipation, diarrhea, " "heartburn, hematochezia and nausea.   Breasts:  Negative for tenderness, breast mass and discharge.   Genitourinary: Negative for dysuria, frequency, genital sores, hematuria, pelvic pain, urgency, vaginal bleeding and vaginal discharge.   Musculoskeletal: Negative for arthralgias, joint swelling and myalgias.   Skin: Negative for rash.   Neurological: Negative for dizziness, weakness, headaches and paresthesias.   Psychiatric/Behavioral: Negative for mood changes. The patient is not nervous/anxious.      {FEMALE ROS (Optional):493635}     OBJECTIVE:   There were no vitals taken for this visit.  Physical Exam  {Exam Choices (Optional):433028}    {Diagnostic Test Results (Optional):430402::\"Diagnostic Test Results:\",\"Labs reviewed in Epic\"}    ASSESSMENT/PLAN:   {Diag Picklist:284121}    {Patient advised of split billing (Optional):571639}    COUNSELING:  {FEMALE COUNSELING MESSAGES:021714::\"Reviewed preventive health counseling, as reflected in patient instructions\"}    Estimated body mass index is 23.72 kg/m  as calculated from the following:    Height as of 9/7/21: 1.635 m (5' 4.37\").    Weight as of 9/7/21: 63.4 kg (139 lb 12.8 oz).    {Weight Management Plan (ACO) Complete if BMI is abnormal-  Ages 18-64  BMI >24.9.  Age 65+ with BMI <23 or >30 (Optional):629415}    She reports that she has never smoked. She has never used smokeless tobacco.      Counseling Resources:  ATP IV Guidelines  Pooled Cohorts Equation Calculator  Breast Cancer Risk Calculator  BRCA-Related Cancer Risk Assessment: FHS-7 Tool  FRAX Risk Assessment  ICSI Preventive Guidelines  Dietary Guidelines for Americans, 2010  USDA's MyPlate  ASA Prophylaxis  Lung CA Screening    Kim Lama MD  [facility]  "

## 2022-09-13 NOTE — PROGRESS NOTES
SUBJECTIVE:   CC: Carie is an 52 year old who presents for preventive health visit.     Patient has been advised of split billing requirements and indicates understanding: Yes  Healthy Habits:     Getting at least 3 servings of Calcium per day:  Yes    Bi-annual eye exam:  Yes    Dental care twice a year:  Yes    Sleep apnea or symptoms of sleep apnea:  None    Diet:  Vegetarian/vegan    Frequency of exercise:  4-5 days/week    Duration of exercise:  45-60 minutes    Taking medications regularly:  Yes    Medication side effects:  Not applicable    PHQ-2 Total Score: 0    Additional concerns today:  No    Carie is here for a preventive health visit.  Overall, she is doing well with the following concerns:      1. Does daily 45 min exercise, sleeps well.     2. White coat hypertension -whenever she checks outside the clinic, blood pressures have been normal.  Not checking very often anymore.    3. Vitamin d deficiency - takes 2000 international unit(s) D3 daily    4. Takes supplement iron, well-tolerated.        Today's PHQ-2 Score:   PHQ-2 ( 1999 Pfizer) 9/13/2022   Q1: Little interest or pleasure in doing things 0   Q2: Feeling down, depressed or hopeless 0   PHQ-2 Score 0   PHQ-2 Total Score (12-17 Years)- Positive if 3 or more points; Administer PHQ-A if positive -   Q1: Little interest or pleasure in doing things Not at all   Q2: Feeling down, depressed or hopeless Not at all   PHQ-2 Score 0       Abuse: Current or Past (Physical, Sexual or Emotional) - No  Do you feel safe in your environment? Yes    Social History     Tobacco Use     Smoking status: Never Smoker     Smokeless tobacco: Never Used   Substance Use Topics     Alcohol use: No     If you drink alcohol do you typically have >3 drinks per day or >7 drinks per week? No    Alcohol Use 9/13/2022   Prescreen: >3 drinks/day or >7 drinks/week? No   Prescreen: >3 drinks/day or >7 drinks/week? -       Reviewed orders with patient.  Reviewed health  maintenance and updated orders accordingly - Yes      Breast Cancer Screening:    Breast CA Risk Assessment (FHS-7) 9/7/2021   Do you have a family history of breast, colon, or ovarian cancer? No / Unknown         Mammogram Screening: Recommended annual mammography  Pertinent mammograms are reviewed under the imaging tab.    History of abnormal Pap smear: NO - age 30-65 PAP every 5 years with negative HPV co-testing recommended  PAP / HPV Latest Ref Rng & Units 9/7/2021 2/16/2016 4/15/2011   PAP   Negative for Intraepithelial Lesion or Malignancy (NILM) - -   PAP (Historical) - - NIL NIL   HPV16 Negative Negative Negative -   HPV18 Negative Negative Negative -   HRHPV Negative Negative Negative -     Reviewed and updated as needed this visit by clinical staff   Tobacco  Allergies  Meds  Problems  Med Hx  Surg Hx  Fam Hx  Soc   Hx          Reviewed and updated as needed this visit by Provider   Tobacco  Allergies  Meds  Problems  Med Hx  Surg Hx  Fam Hx               Review of Systems   Constitutional: Negative for chills and fever.   HENT: Negative for congestion, ear pain, hearing loss and sore throat.    Eyes: Negative for pain and visual disturbance.   Respiratory: Negative for cough and shortness of breath.    Cardiovascular: Negative for chest pain, palpitations and peripheral edema.   Gastrointestinal: Negative for abdominal pain, constipation, diarrhea, heartburn, hematochezia and nausea.   Breasts:  Negative for tenderness, breast mass and discharge.   Genitourinary: Negative for dysuria, frequency, genital sores, hematuria, pelvic pain, urgency, vaginal bleeding and vaginal discharge.   Musculoskeletal: Negative for arthralgias, joint swelling and myalgias.   Skin: Negative for rash.   Neurological: Negative for dizziness, weakness, headaches and paresthesias.   Psychiatric/Behavioral: Negative for mood changes. The patient is not nervous/anxious.           OBJECTIVE:   BP (!) 160/80 (BP  "Location: Right arm, Patient Position: Sitting, Cuff Size: Adult Regular)   Pulse 77   Temp 98  F (36.7  C) (Temporal)   Resp 16   Ht 1.634 m (5' 4.33\")   Wt 64.8 kg (142 lb 12.8 oz)   LMP 09/09/2022 (Within Days)   SpO2 100%   BMI 24.26 kg/m    Physical Exam  GENERAL: healthy, alert and no distress  EYES: Eyes grossly normal to inspection, PERRL and conjunctivae and sclerae normal  HENT: ear canals and TM's normal, nose and mouth without ulcers or lesions  NECK: no adenopathy, no asymmetry, masses, or scars and thyroid normal to palpation  RESP: lungs clear to auscultation - no rales, rhonchi or wheezes  CV: regular rate and rhythm, normal S1 S2, no S3 or S4, no murmur, click or rub, no peripheral edema and peripheral pulses strong  MS: no gross musculoskeletal defects noted, no edema  SKIN: no suspicious lesions or rashes  NEURO: Normal strength and tone, mentation intact and speech normal  PSYCH: mentation appears normal, affect normal/bright    Diagnostic Test Results:  Labs reviewed in Epic    ASSESSMENT/PLAN:   (Z00.00) Routine general medical examination at a health care facility  (primary encounter diagnosis)  Comment: Up-to-date on screening, healthy habits.  Plan: Hemoglobin A1c            (Z12.11) Screen for colon cancer  Comment: routine screening  Plan: Fecal colorectal cancer screen FIT - Future         (S+30)            (R03.0) White coat syndrome without diagnosis of hypertension  Comment: No treatment needed at this time.  Continue to monitor intermittently at home.    (E55.9) Vitamin D deficiency  Comment: Taking supplement of 2000 IU daily.  Check labs.  Plan: Vitamin D Deficiency            (E61.1) Iron deficiency  Comment: Taking daily iron tablet.  Check labs.  Plan: Ferritin, Iron and iron binding capacity, CBC         with platelets                  COUNSELING:  Reviewed preventive health counseling, as reflected in patient instructions       Regular exercise       Healthy " "diet/nutrition       Osteoporosis prevention/bone health       (Anais)menopause management    Estimated body mass index is 24.26 kg/m  as calculated from the following:    Height as of this encounter: 1.634 m (5' 4.33\").    Weight as of this encounter: 64.8 kg (142 lb 12.8 oz).        She reports that she has never smoked. She has never used smokeless tobacco.      Counseling Resources:  ATP IV Guidelines  Pooled Cohorts Equation Calculator  Breast Cancer Risk Calculator  BRCA-Related Cancer Risk Assessment: FHS-7 Tool  FRAX Risk Assessment  ICSI Preventive Guidelines  Dietary Guidelines for Americans, 2010  USDA's MyPlate  ASA Prophylaxis  Lung CA Screening    Kim Lama MD  [facility]  "

## 2022-09-14 LAB
DEPRECATED CALCIDIOL+CALCIFEROL SERPL-MC: 21 UG/L (ref 20–75)
FERRITIN SERPL-MCNC: 52 NG/ML (ref 8–252)
IRON SATN MFR SERPL: 24 % (ref 15–46)
IRON SERPL-MCNC: 82 UG/DL (ref 35–180)
TIBC SERPL-MCNC: 337 UG/DL (ref 240–430)

## 2022-09-22 PROCEDURE — 82274 ASSAY TEST FOR BLOOD FECAL: CPT | Performed by: INTERNAL MEDICINE

## 2022-09-26 LAB — HEMOCCULT STL QL IA: NEGATIVE

## 2022-10-05 ENCOUNTER — ALLIED HEALTH/NURSE VISIT (OUTPATIENT)
Dept: PEDIATRICS | Facility: CLINIC | Age: 52
End: 2022-10-05
Payer: COMMERCIAL

## 2022-10-05 VITALS — DIASTOLIC BLOOD PRESSURE: 72 MMHG | SYSTOLIC BLOOD PRESSURE: 138 MMHG

## 2022-10-05 DIAGNOSIS — Z01.30 BP CHECK: Primary | ICD-10-CM

## 2022-10-05 PROCEDURE — 99207 PR NO CHARGE NURSE ONLY: CPT | Performed by: INTERNAL MEDICINE

## 2022-10-05 NOTE — PROGRESS NOTES
Carie Hi was evaluated at [facility] on October 5, 2022 at which time her blood pressure was:    BP Readings from Last 3 Encounters:   10/05/22 138/72   09/13/22 (!) 160/80   05/13/22 136/78     Pulse Readings from Last 3 Encounters:   09/13/22 77   09/08/21 100   09/07/21 77       Reviewed lifestyle modifications for blood pressure control and reduction: including making healthy food choices, managing weight, getting regular exercise, smoking cessation, reducing alcohol consumption, monitoring blood pressure regularly.     Symptoms: None    BP Goal:< 140/90 mmHg    BP Assessment:  BP at goal    Potential Reasons for BP too high: NA - Not applicable    BP Follow-Up Plan: Recheck BP in 6 months at pharmacy    Recommendation to Provider: pt in pharmacy for bp check. Was elevated at physical in Sept. Usually runs high in clinic setting. Not currently on HBP mediction and no symptoms reported. Advised her to come back and check again in 3 months.     Note completed by:   Ching Shannon, PharmD  [facility]  [phone]

## 2022-11-20 ENCOUNTER — HEALTH MAINTENANCE LETTER (OUTPATIENT)
Age: 52
End: 2022-11-20

## 2023-06-21 ENCOUNTER — PATIENT OUTREACH (OUTPATIENT)
Dept: CARE COORDINATION | Facility: CLINIC | Age: 53
End: 2023-06-21
Payer: COMMERCIAL

## 2023-07-24 ENCOUNTER — ANCILLARY PROCEDURE (OUTPATIENT)
Dept: MAMMOGRAPHY | Facility: CLINIC | Age: 53
End: 2023-07-24
Payer: COMMERCIAL

## 2023-07-24 DIAGNOSIS — Z12.31 VISIT FOR SCREENING MAMMOGRAM: ICD-10-CM

## 2023-07-24 PROCEDURE — 77063 BREAST TOMOSYNTHESIS BI: CPT | Mod: TC | Performed by: RADIOLOGY

## 2023-07-24 PROCEDURE — 77067 SCR MAMMO BI INCL CAD: CPT | Mod: TC | Performed by: RADIOLOGY

## 2023-09-20 ENCOUNTER — ALLIED HEALTH/NURSE VISIT (OUTPATIENT)
Dept: PEDIATRICS | Facility: CLINIC | Age: 53
End: 2023-09-20

## 2023-09-20 ENCOUNTER — LAB (OUTPATIENT)
Dept: PEDIATRICS | Facility: CLINIC | Age: 53
End: 2023-09-20

## 2023-09-20 ENCOUNTER — OFFICE VISIT (OUTPATIENT)
Dept: PEDIATRICS | Facility: CLINIC | Age: 53
End: 2023-09-20
Payer: COMMERCIAL

## 2023-09-20 VITALS
BODY MASS INDEX: 24.44 KG/M2 | WEIGHT: 146.7 LBS | SYSTOLIC BLOOD PRESSURE: 170 MMHG | DIASTOLIC BLOOD PRESSURE: 90 MMHG | HEART RATE: 72 BPM | HEIGHT: 65 IN | TEMPERATURE: 98.1 F | OXYGEN SATURATION: 100 % | RESPIRATION RATE: 18 BRPM

## 2023-09-20 VITALS — DIASTOLIC BLOOD PRESSURE: 86 MMHG | SYSTOLIC BLOOD PRESSURE: 138 MMHG

## 2023-09-20 DIAGNOSIS — Z12.11 SCREEN FOR COLON CANCER: ICD-10-CM

## 2023-09-20 DIAGNOSIS — E61.1 IRON DEFICIENCY: ICD-10-CM

## 2023-09-20 DIAGNOSIS — Z01.30 BP CHECK: Primary | ICD-10-CM

## 2023-09-20 DIAGNOSIS — Z00.00 ROUTINE GENERAL MEDICAL EXAMINATION AT A HEALTH CARE FACILITY: Primary | ICD-10-CM

## 2023-09-20 DIAGNOSIS — E55.9 VITAMIN D DEFICIENCY: ICD-10-CM

## 2023-09-20 LAB
ALBUMIN SERPL BCG-MCNC: 4 G/DL (ref 3.5–5.2)
ALP SERPL-CCNC: 66 U/L (ref 35–104)
ALT SERPL W P-5'-P-CCNC: 32 U/L (ref 0–50)
ANION GAP SERPL CALCULATED.3IONS-SCNC: 10 MMOL/L (ref 7–15)
AST SERPL W P-5'-P-CCNC: 32 U/L (ref 0–45)
BILIRUB SERPL-MCNC: 0.6 MG/DL
BUN SERPL-MCNC: 9 MG/DL (ref 6–20)
CALCIUM SERPL-MCNC: 9 MG/DL (ref 8.6–10)
CHLORIDE SERPL-SCNC: 104 MMOL/L (ref 98–107)
CHOLEST SERPL-MCNC: 184 MG/DL
CREAT SERPL-MCNC: 0.6 MG/DL (ref 0.51–0.95)
DEPRECATED HCO3 PLAS-SCNC: 26 MMOL/L (ref 22–29)
EGFRCR SERPLBLD CKD-EPI 2021: >90 ML/MIN/1.73M2
FERRITIN SERPL-MCNC: 30 NG/ML (ref 11–328)
GLUCOSE SERPL-MCNC: 80 MG/DL (ref 70–99)
HBA1C MFR BLD: 5.5 % (ref 0–5.6)
HDLC SERPL-MCNC: 44 MG/DL
IRON BINDING CAPACITY (ROCHE): 370 UG/DL (ref 240–430)
IRON SATN MFR SERPL: 18 % (ref 15–46)
IRON SERPL-MCNC: 65 UG/DL (ref 37–145)
LDLC SERPL CALC-MCNC: 93 MG/DL
NONHDLC SERPL-MCNC: 140 MG/DL
POTASSIUM SERPL-SCNC: 4 MMOL/L (ref 3.4–5.3)
PROT SERPL-MCNC: 6.9 G/DL (ref 6.4–8.3)
SODIUM SERPL-SCNC: 140 MMOL/L (ref 136–145)
TRIGL SERPL-MCNC: 236 MG/DL

## 2023-09-20 PROCEDURE — 82306 VITAMIN D 25 HYDROXY: CPT | Performed by: INTERNAL MEDICINE

## 2023-09-20 PROCEDURE — 36415 COLL VENOUS BLD VENIPUNCTURE: CPT | Performed by: INTERNAL MEDICINE

## 2023-09-20 PROCEDURE — 83036 HEMOGLOBIN GLYCOSYLATED A1C: CPT | Performed by: INTERNAL MEDICINE

## 2023-09-20 PROCEDURE — 83540 ASSAY OF IRON: CPT | Performed by: INTERNAL MEDICINE

## 2023-09-20 PROCEDURE — 82728 ASSAY OF FERRITIN: CPT | Performed by: INTERNAL MEDICINE

## 2023-09-20 PROCEDURE — 80053 COMPREHEN METABOLIC PANEL: CPT | Performed by: INTERNAL MEDICINE

## 2023-09-20 PROCEDURE — 99207 PR NO CHARGE NURSE ONLY: CPT | Performed by: INTERNAL MEDICINE

## 2023-09-20 PROCEDURE — 83550 IRON BINDING TEST: CPT | Performed by: INTERNAL MEDICINE

## 2023-09-20 PROCEDURE — 80061 LIPID PANEL: CPT | Performed by: INTERNAL MEDICINE

## 2023-09-20 PROCEDURE — 99396 PREV VISIT EST AGE 40-64: CPT | Performed by: INTERNAL MEDICINE

## 2023-09-20 SDOH — HEALTH STABILITY: PHYSICAL HEALTH: ON AVERAGE, HOW MANY MINUTES DO YOU ENGAGE IN EXERCISE AT THIS LEVEL?: 50 MIN

## 2023-09-20 SDOH — HEALTH STABILITY: PHYSICAL HEALTH: ON AVERAGE, HOW MANY DAYS PER WEEK DO YOU ENGAGE IN MODERATE TO STRENUOUS EXERCISE (LIKE A BRISK WALK)?: 5 DAYS

## 2023-09-20 ASSESSMENT — PAIN SCALES - GENERAL: PAINLEVEL: NO PAIN (0)

## 2023-09-20 ASSESSMENT — ENCOUNTER SYMPTOMS
DIARRHEA: 0
DYSURIA: 0
CONSTIPATION: 0
WEAKNESS: 0
COUGH: 0
HEMATOCHEZIA: 0
FEVER: 0
ABDOMINAL PAIN: 0
FREQUENCY: 0
MYALGIAS: 0
HEMATURIA: 0
PALPITATIONS: 0
EYE PAIN: 0
ARTHRALGIAS: 0
HEARTBURN: 0
JOINT SWELLING: 0
BREAST MASS: 0
DIZZINESS: 0
NERVOUS/ANXIOUS: 0
SHORTNESS OF BREATH: 0
CHILLS: 0
PARESTHESIAS: 0
HEADACHES: 0
NAUSEA: 0
SORE THROAT: 0

## 2023-09-20 ASSESSMENT — LIFESTYLE VARIABLES
SKIP TO QUESTIONS 9-10: 1
HOW OFTEN DO YOU HAVE A DRINK CONTAINING ALCOHOL: MONTHLY OR LESS
AUDIT-C TOTAL SCORE: 1
HOW OFTEN DO YOU HAVE SIX OR MORE DRINKS ON ONE OCCASION: NEVER
HOW MANY STANDARD DRINKS CONTAINING ALCOHOL DO YOU HAVE ON A TYPICAL DAY: 1 OR 2

## 2023-09-20 ASSESSMENT — SOCIAL DETERMINANTS OF HEALTH (SDOH)
HOW OFTEN DO YOU GET TOGETHER WITH FRIENDS OR RELATIVES?: TWICE A WEEK
IN A TYPICAL WEEK, HOW MANY TIMES DO YOU TALK ON THE PHONE WITH FAMILY, FRIENDS, OR NEIGHBORS?: MORE THAN THREE TIMES A WEEK
HOW OFTEN DO YOU ATTENT MEETINGS OF THE CLUB OR ORGANIZATION YOU BELONG TO?: NEVER
DO YOU BELONG TO ANY CLUBS OR ORGANIZATIONS SUCH AS CHURCH GROUPS UNIONS, FRATERNAL OR ATHLETIC GROUPS, OR SCHOOL GROUPS?: NO
HOW OFTEN DO YOU ATTEND CHURCH OR RELIGIOUS SERVICES?: NEVER

## 2023-09-20 NOTE — PROGRESS NOTES
SUBJECTIVE:   CC: Carie is an 53 year old who presents for preventive health visit.       2023     1:19 PM   Additional Questions   Roomed by Yanira   Accompanied by moraima         2023     1:19 PM   Patient Reported Additional Medications   Patient reports taking the following new medications none       Healthy Habits:     Getting at least 3 servings of Calcium per day:  Yes    Bi-annual eye exam:  Yes    Dental care twice a year:  Yes    Sleep apnea or symptoms of sleep apnea:  None    Diet:  Vegetarian/vegan    Frequency of exercise:  4-5 days/week    Duration of exercise:  45-60 minutes    Taking medications regularly:  Not Applicable    Medication side effects:  Not applicable    Additional concerns today:  No    Carie is here for a preventive health visit.  Overall, she is doing well with the following concerns:      Blood pressure - in the past due to white coat hypertension. Has not been checking - her monitor broke.   Vitamin D - 2000 international unit(s) daily  Has had iron deficiency in the past. Last year it was corrected.   Menses have become irregular. No significant gavi-menopause symptoms.     Today's PHQ-2 Score:       2023    10:18 AM   PHQ-2 (  Pfizer)   Q1: Little interest or pleasure in doing things 0   Q2: Feeling down, depressed or hopeless 0   PHQ-2 Score 0   Q1: Little interest or pleasure in doing things Not at all   Q2: Feeling down, depressed or hopeless Not at all   PHQ-2 Score 0                       Social History     Tobacco Use    Smoking status: Never    Smokeless tobacco: Never   Substance Use Topics    Alcohol use: No             2023    10:18 AM   Alcohol Use   Prescreen: >3 drinks/day or >7 drinks/week? Not Applicable          No data to display              Reviewed orders with patient.  Reviewed health maintenance and updated orders accordingly - Yes      Breast Cancer Screenin/7/2021     2:48 PM   Breast CA Risk Assessment (FHS-7)   Do you  have a family history of breast, colon, or ovarian cancer? No / Unknown         Mammogram Screening: Recommended annual mammography  Pertinent mammograms are reviewed under the imaging tab.    History of abnormal Pap smear: NO - age 30-65 PAP every 5 years with negative HPV co-testing recommended      Latest Ref Rng & Units 9/7/2021     5:45 PM 2/16/2016     9:39 PM 2/16/2016    12:00 AM   PAP / HPV   PAP  Negative for Intraepithelial Lesion or Malignancy (NILM)      PAP (Historical)    NIL    HPV 16 DNA Negative Negative  Negative     HPV 18 DNA Negative Negative  Negative     Other HR HPV Negative Negative  Negative       Reviewed and updated as needed this visit by clinical staff   Tobacco  Allergies  Meds  Problems  Med Hx  Surg Hx  Fam Hx          Reviewed and updated as needed this visit by Provider   Tobacco  Allergies  Meds  Problems  Med Hx  Surg Hx  Fam Hx             Review of Systems   Constitutional:  Negative for chills and fever.   HENT:  Negative for congestion, ear pain, hearing loss and sore throat.    Eyes:  Negative for pain and visual disturbance.   Respiratory:  Negative for cough and shortness of breath.    Cardiovascular:  Negative for chest pain, palpitations and peripheral edema.   Gastrointestinal:  Negative for abdominal pain, constipation, diarrhea, heartburn, hematochezia and nausea.   Breasts:  Negative for tenderness, breast mass and discharge.   Genitourinary:  Negative for dysuria, frequency, genital sores, hematuria, pelvic pain, urgency, vaginal bleeding and vaginal discharge.   Musculoskeletal:  Negative for arthralgias, joint swelling and myalgias.   Skin:  Negative for rash.   Neurological:  Negative for dizziness, weakness, headaches and paresthesias.   Psychiatric/Behavioral:  Negative for mood changes. The patient is not nervous/anxious.           OBJECTIVE:   BP (!) 170/90 (BP Location: Right arm, Patient Position: Sitting, Cuff Size: Adult Regular)   Pulse  "72   Temp 98.1  F (36.7  C) (Tympanic)   Resp 18   Ht 1.645 m (5' 4.75\")   Wt 66.5 kg (146 lb 11.2 oz)   LMP 09/02/2023 (Approximate)   SpO2 100%   BMI 24.60 kg/m    Physical Exam  GENERAL: healthy, alert and no distress  EYES: Eyes grossly normal to inspection, PERRL and conjunctivae and sclerae normal  HENT: ear canals and TM's normal, nose and mouth without ulcers or lesions  NECK: no adenopathy, no asymmetry, masses, or scars and thyroid normal to palpation  RESP: lungs clear to auscultation - no rales, rhonchi or wheezes  CV: regular rate and rhythm, normal S1 S2, no S3 or S4, no murmur, click or rub, no peripheral edema and peripheral pulses strong  MS: no gross musculoskeletal defects noted, no edema  SKIN: no suspicious lesions or rashes  NEURO: Normal strength and tone, mentation intact and speech normal  PSYCH: mentation appears normal, affect normal/bright    Diagnostic Test Results:  Labs reviewed in Epic    ASSESSMENT/PLAN:   (Z00.00) Routine general medical examination at a health care facility  (primary encounter diagnosis)  Comment: healthy habits. Son moved to college this month, working on getting back into running and healthy habits.   Plan: Comprehensive metabolic panel (BMP + Alb, Alk         Phos, ALT, AST, Total. Bili, TP), Lipid panel         reflex to direct LDL Non-fasting, Hemoglobin         A1c            (Z12.11) Screen for colon cancer  Comment: routine screening  Plan: ARIEL([employer])      (E55.9) Vitamin D deficiency  Comment: has been stable with 2000 international unit(s) supplement daily  Plan: Vitamin D Deficiency            (E61.1) Iron deficiency  Comment: not taking supplement for the past year, had been corrected. Will continue to monitor.   Plan: Ferritin, Iron and iron binding capacity                  COUNSELING:  Reviewed preventive health counseling, as reflected in patient instructions        She reports that she has never smoked. She has never used " smokeless tobacco.          Kim Lama MD  [facility]

## 2023-09-20 NOTE — PROGRESS NOTES
Carie Hi was evaluated at [facility] on September 20, 2023 at which time her blood pressure was:    BP Readings from Last 1 Encounters:   09/20/23 138/86     No data recorded      Reviewed lifestyle modifications for blood pressure control and reduction: including making healthy food choices, managing weight, getting regular exercise, smoking cessation, reducing alcohol consumption, monitoring blood pressure regularly.     Symptoms: None    BP Goal:< 140/90 mmHg    BP Assessment:  BP at goal    Potential Reasons for BP too high: NA - Not applicable    BP Follow-Up Plan: Recheck BP in 6 months at pharmacy    Recommendation to Provider: pt in pharmacy for BP check. Elevated reading upstairs in the clinic. Seems to be the trend with elevated readings in clinic. Denies symptoms or concerns other than wanting to make sure it had come down after appointment. She plans on coming to pharmacy at least monthly to check and we will report in epic.     Note completed by: Ching Shannon RPH

## 2023-09-21 LAB — DEPRECATED CALCIDIOL+CALCIFEROL SERPL-MC: 21 UG/L (ref 20–75)

## 2023-10-04 ENCOUNTER — MYC MEDICAL ADVICE (OUTPATIENT)
Dept: PEDIATRICS | Facility: CLINIC | Age: 53
End: 2023-10-04
Payer: COMMERCIAL

## 2023-10-10 LAB — NONINV COLON CA DNA+OCC BLD SCRN STL QL: NEGATIVE

## 2023-10-18 ENCOUNTER — ALLIED HEALTH/NURSE VISIT (OUTPATIENT)
Dept: PEDIATRICS | Facility: CLINIC | Age: 53
End: 2023-10-18
Payer: COMMERCIAL

## 2023-10-18 VITALS — SYSTOLIC BLOOD PRESSURE: 145 MMHG | DIASTOLIC BLOOD PRESSURE: 86 MMHG

## 2023-10-18 DIAGNOSIS — Z01.30 BP CHECK: Primary | ICD-10-CM

## 2023-10-18 PROCEDURE — 99207 PR NO CHARGE NURSE ONLY: CPT | Performed by: INTERNAL MEDICINE

## 2023-10-18 NOTE — PROGRESS NOTES
Carie Hi was evaluated at [facility] on October 18, 2023 at which time her blood pressure was:    BP Readings from Last 3 Encounters:   10/18/23 (!) 145/86   09/20/23 138/86   09/20/23 (!) 170/90     Pulse Readings from Last 3 Encounters:   09/20/23 72   09/13/22 77   09/08/21 100       Reviewed lifestyle modifications for blood pressure control and reduction: including making healthy food choices, managing weight, getting regular exercise, smoking cessation, reducing alcohol consumption, monitoring blood pressure regularly.     Symptoms: None    BP Goal:< 140/90 mmHg    BP Assessment:  BP too high    Potential Reasons for BP too high: Unknown/Other: Did start a new job today but not feeling stressed.    BP Follow-Up Plan: Recheck BP in 30 days at pharmacy    Recommendation to Provider: Carie had BP checked in the pharmacy today, checked 3 times but averaged out at 145/86. New job started today but she did not seem stressed over it. Does not have any symptoms of high blood pressure. Discussed hypertension meds and how they work and where we start with them to help her understand the benefit if we go that route. Encouraged her to purchase an at-home cuff. Her previous one was for the wrist - said to get an arm cuff so she can check at other times in the day. BP was elevated today even after taking some deep breaths, but agreed not to jump to conclusion as BP varies. Exercises daily and maintains good nutrition. She scheduled another visit to come in and have BP checked in the pharmacy next month.    Note completed by: Thank You~  Donnie Gibson, Pharmacist   [facility]

## 2023-11-24 ENCOUNTER — ALLIED HEALTH/NURSE VISIT (OUTPATIENT)
Dept: PEDIATRICS | Facility: CLINIC | Age: 53
End: 2023-11-24
Payer: COMMERCIAL

## 2023-11-24 VITALS — SYSTOLIC BLOOD PRESSURE: 134 MMHG | DIASTOLIC BLOOD PRESSURE: 76 MMHG

## 2023-11-24 DIAGNOSIS — Z01.30 BP CHECK: Primary | ICD-10-CM

## 2023-11-24 PROCEDURE — 99207 PR NO CHARGE NURSE ONLY: CPT | Performed by: INTERNAL MEDICINE

## 2023-11-24 NOTE — PROGRESS NOTES
Carie Hi was evaluated at [facility] on November 24, 2023 at which time her blood pressure was:    BP Readings from Last 1 Encounters:   11/24/23 134/76     No data recorded      Reviewed lifestyle modifications for blood pressure control and reduction: including making healthy food choices, managing weight, getting regular exercise, smoking cessation, reducing alcohol consumption, monitoring blood pressure regularly.     Symptoms: None    BP Goal:< 140/90 mmHg    BP Assessment:  BP at goal    Potential Reasons for BP too high: NA - Not applicable    BP Follow-Up Plan: Recheck BP in 6 months at pharmacy    Recommendation to Provider: pt in pharmacy today for BP check. Has BP readings that really fluctuate both in clinic and at pharmacy. She is checking in pharmacy monthly. Readings have been normal and slightly elevated both around 140/90 - did discuss with pt may want to consider medication and discuss with MD. She will continue to monitor monthly in pharmacy.     Note completed by: Ching Shannon RP

## 2023-12-14 ENCOUNTER — ALLIED HEALTH/NURSE VISIT (OUTPATIENT)
Dept: PEDIATRICS | Facility: CLINIC | Age: 53
End: 2023-12-14
Payer: COMMERCIAL

## 2023-12-14 VITALS — SYSTOLIC BLOOD PRESSURE: 138 MMHG | DIASTOLIC BLOOD PRESSURE: 78 MMHG

## 2023-12-14 DIAGNOSIS — Z01.30 BP CHECK: Primary | ICD-10-CM

## 2023-12-14 PROCEDURE — 99207 PR NO CHARGE NURSE ONLY: CPT | Performed by: INTERNAL MEDICINE

## 2023-12-14 NOTE — PROGRESS NOTES
Carie Hi was evaluated at [facility] on December 14, 2023 at which time her blood pressure was:    BP Readings from Last 1 Encounters:   12/13/23 138/78     No data recorded      Reviewed lifestyle modifications for blood pressure control and reduction: including making healthy food choices, managing weight, getting regular exercise, smoking cessation, reducing alcohol consumption, monitoring blood pressure regularly.       BP Goal:< 140/90 mmHg    BP Assessment:  BP too high    Potential Reasons for BP too high: Anxiety    BP Follow-Up Plan: Recheck BP in 30 days at pharmacy    Recommendation to Provider: pt in pharmacy for regular [facility] BP check. Her first reading was elevated at 150/82 and 2nd reading down some at 138/78. She reports starting new job beginning of December. No symptoms reported. Not currently taking any BP medication with plan to monitor. Has several readings hovering around 140/90. Pt reports elevated readings in clinic and overall lower readings in pharmacy (less anxiety). Possible new anxiety/stress with new job could be causing elevated readings. She plans on returning in about 30 days to pharmacy for another reading. She does report lifestyle changes - curring out salt in diet , working out.etc.    Note completed by: Ching Shannon [facility]

## 2023-12-15 NOTE — PROGRESS NOTES
Call patient. Have her continue to check BPs three times weekly for the next month and send in values as well as come in for a BP check at the pharmacy.   Thanks,  Nacho Hernandez PA-C

## 2023-12-15 NOTE — PROGRESS NOTES
RN called and spoke with patient. Relayed provider message. Patient will purchase a home BP cuff as well, and compare it to pharmacy readings. Verbalizes she will send in her log.   Instructed pt to call back for any questions.      BP Readings from Last 6 Encounters:   12/13/23 138/78   11/24/23 134/76   10/18/23 (!) 145/86   09/20/23 138/86   09/20/23 (!) 170/90   10/05/22 138/72       Elle HAMMOND RN on 12/15/2023 at 8:13 AM

## 2023-12-27 ENCOUNTER — ALLIED HEALTH/NURSE VISIT (OUTPATIENT)
Dept: PEDIATRICS | Facility: CLINIC | Age: 53
End: 2023-12-27
Payer: COMMERCIAL

## 2023-12-27 VITALS — DIASTOLIC BLOOD PRESSURE: 80 MMHG | SYSTOLIC BLOOD PRESSURE: 148 MMHG

## 2023-12-27 DIAGNOSIS — Z01.30 BP CHECK: Primary | ICD-10-CM

## 2023-12-27 PROCEDURE — 99207 PR NO CHARGE NURSE ONLY: CPT | Performed by: INTERNAL MEDICINE

## 2023-12-27 NOTE — Clinical Note
Carie Hi was evaluated at [facility] on December 27, 2023 at which time her blood pressure was:  BP Readings from Last 1 Encounters: 12/25/23 : (!) 148/80  No data recorded   Reviewed lifestyle modifications for blood pressure control and reduction: including making healthy food choices, managing weight, getting regular exercise, smoking cessation, reducing alcohol consumption, monitoring blood pressure regularly.   Symptoms: None  BP Goal:< 140/90 mmHg  BP Assessment:  BP too high  Potential Reasons for BP too high: NA - Not applicable  BP Follow-Up Plan: Recheck BP in 30 days at pharmacy  Recommendation to Provider: pt will continue to monitor at home or come into pharmacy. She is planning on taking home readings once daily in the evening.   Note completed by: Ching Shannon RPH

## 2023-12-27 NOTE — PROGRESS NOTES
Carie Hi was evaluated at [facility] on December 27, 2023 at which time her blood pressure was:    BP Readings from Last 1 Encounters:   12/25/23 (!) 148/80     No data recorded      Reviewed lifestyle modifications for blood pressure control and reduction: including making healthy food choices, managing weight, getting regular exercise, smoking cessation, reducing alcohol consumption, monitoring blood pressure regularly.     Symptoms: None    BP Goal:< 140/90 mmHg    BP Assessment:  BP too high    Potential Reasons for BP too high: NA - Not applicable    BP Follow-Up Plan: Recheck BP in 30 days at pharmacy    Recommendation to Provider: pt in pharmacy for BP check. Her first reading was with her home monitor 152/95 and the 2nd reading was 5 minutes later on pharmacy cuff. Pt has been coming to pharmacy for readings usually every month. Some are normal and some are elevated. Discussed with pt her plan to take BP at home daily. She will mychart her readings or call [facility] and we will input them. Possible need to start medication to lower BP.     Note completed by: Ching Shannon RPH

## 2023-12-28 ENCOUNTER — MYC MEDICAL ADVICE (OUTPATIENT)
Dept: PEDIATRICS | Facility: CLINIC | Age: 53
End: 2023-12-28
Payer: COMMERCIAL

## 2023-12-28 NOTE — TELEPHONE ENCOUNTER
Dr. Lama,    Pt responds to  message, discussing plan for BP monitoring.     Please review and advise, thanks!  Malcolm Chen RN on 12/28/2023 at 10:14 AM

## 2024-01-30 ENCOUNTER — ALLIED HEALTH/NURSE VISIT (OUTPATIENT)
Dept: PEDIATRICS | Facility: CLINIC | Age: 54
End: 2024-01-30
Payer: COMMERCIAL

## 2024-01-30 VITALS — DIASTOLIC BLOOD PRESSURE: 72 MMHG | SYSTOLIC BLOOD PRESSURE: 138 MMHG

## 2024-01-30 DIAGNOSIS — Z01.30 BP CHECK: Primary | ICD-10-CM

## 2024-01-30 PROCEDURE — 99207 PR NO CHARGE NURSE ONLY: CPT | Performed by: INTERNAL MEDICINE

## 2024-01-30 NOTE — PROGRESS NOTES
Carie Hi was evaluated at [facility] on January 30, 2024 at which time her blood pressure was:    BP Readings from Last 3 Encounters:   01/30/24 138/72   12/25/23 (!) 148/80   12/13/23 138/78     Pulse Readings from Last 3 Encounters:   09/20/23 72   09/13/22 77   09/08/21 100       Reviewed lifestyle modifications for blood pressure control and reduction: including making healthy food choices, managing weight, getting regular exercise, smoking cessation, reducing alcohol consumption, monitoring blood pressure regularly.     Symptoms: None    BP Goal:< 140/90 mmHg    BP Assessment:  BP at goal    Potential Reasons for BP too high: NA - Not applicable    BP Follow-Up Plan: Recheck BP in 6 months at pharmacy    Recommendation to Provider: Carie was in the pharmacy for a blood pressure check. Vitals were good at goal 138/72. Initial reading was high and possibly inaccurate, second check 5 minutes later was at goal. Not on any medication. Has been actively trying to reduce dietary sodium. Has had some recent travel that has made it more difficult to monitor blood pressure and has more travel ahead. Decided she will bring BP cuff with her to document better moving forward. No concerns at this time.    Note completed by: Thank You~  Donnie Gibson, Pharmacist   [facility]

## 2024-06-14 ENCOUNTER — ALLIED HEALTH/NURSE VISIT (OUTPATIENT)
Dept: PEDIATRICS | Facility: CLINIC | Age: 54
End: 2024-06-14
Payer: COMMERCIAL

## 2024-06-14 VITALS — SYSTOLIC BLOOD PRESSURE: 136 MMHG | DIASTOLIC BLOOD PRESSURE: 78 MMHG

## 2024-06-14 DIAGNOSIS — Z01.30 BP CHECK: Primary | ICD-10-CM

## 2024-06-14 PROCEDURE — 99207 PR NO CHARGE NURSE ONLY: CPT | Performed by: INTERNAL MEDICINE

## 2024-07-26 ENCOUNTER — ANCILLARY PROCEDURE (OUTPATIENT)
Dept: MAMMOGRAPHY | Facility: CLINIC | Age: 54
End: 2024-07-26
Payer: COMMERCIAL

## 2024-07-26 DIAGNOSIS — Z12.31 VISIT FOR SCREENING MAMMOGRAM: ICD-10-CM

## 2024-07-26 PROCEDURE — 77063 BREAST TOMOSYNTHESIS BI: CPT | Mod: TC | Performed by: RADIOLOGY

## 2024-07-26 PROCEDURE — 77067 SCR MAMMO BI INCL CAD: CPT | Mod: TC | Performed by: RADIOLOGY

## 2024-09-25 SDOH — HEALTH STABILITY: PHYSICAL HEALTH: ON AVERAGE, HOW MANY MINUTES DO YOU ENGAGE IN EXERCISE AT THIS LEVEL?: 60 MIN

## 2024-09-25 SDOH — HEALTH STABILITY: PHYSICAL HEALTH: ON AVERAGE, HOW MANY DAYS PER WEEK DO YOU ENGAGE IN MODERATE TO STRENUOUS EXERCISE (LIKE A BRISK WALK)?: 5 DAYS

## 2024-09-25 ASSESSMENT — SOCIAL DETERMINANTS OF HEALTH (SDOH): HOW OFTEN DO YOU GET TOGETHER WITH FRIENDS OR RELATIVES?: ONCE A WEEK

## 2024-09-26 ENCOUNTER — OFFICE VISIT (OUTPATIENT)
Dept: PEDIATRICS | Facility: CLINIC | Age: 54
End: 2024-09-26
Payer: COMMERCIAL

## 2024-09-26 VITALS
OXYGEN SATURATION: 99 % | HEIGHT: 65 IN | BODY MASS INDEX: 25.31 KG/M2 | DIASTOLIC BLOOD PRESSURE: 92 MMHG | TEMPERATURE: 97.5 F | SYSTOLIC BLOOD PRESSURE: 168 MMHG | HEART RATE: 94 BPM | WEIGHT: 151.9 LBS | RESPIRATION RATE: 20 BRPM

## 2024-09-26 DIAGNOSIS — E78.5 HYPERLIPIDEMIA LDL GOAL <100: ICD-10-CM

## 2024-09-26 DIAGNOSIS — Z83.3 FAMILY HISTORY OF DIABETES MELLITUS: ICD-10-CM

## 2024-09-26 DIAGNOSIS — R01.1 NEWLY RECOGNIZED HEART MURMUR: ICD-10-CM

## 2024-09-26 DIAGNOSIS — E55.9 VITAMIN D DEFICIENCY: ICD-10-CM

## 2024-09-26 DIAGNOSIS — E61.1 IRON DEFICIENCY: ICD-10-CM

## 2024-09-26 DIAGNOSIS — Z00.00 ROUTINE GENERAL MEDICAL EXAMINATION AT A HEALTH CARE FACILITY: Primary | ICD-10-CM

## 2024-09-26 LAB
CHOLEST SERPL-MCNC: 183 MG/DL
ERYTHROCYTE [DISTWIDTH] IN BLOOD BY AUTOMATED COUNT: 12.8 % (ref 10–15)
EST. AVERAGE GLUCOSE BLD GHB EST-MCNC: 111 MG/DL
FASTING STATUS PATIENT QL REPORTED: ABNORMAL
FERRITIN SERPL-MCNC: 18 NG/ML (ref 11–328)
HBA1C MFR BLD: 5.5 % (ref 0–5.6)
HCT VFR BLD AUTO: 40.1 % (ref 35–47)
HDLC SERPL-MCNC: 36 MG/DL
HGB BLD-MCNC: 13.2 G/DL (ref 11.7–15.7)
IRON BINDING CAPACITY (ROCHE): 412 UG/DL (ref 240–430)
IRON SATN MFR SERPL: 23 % (ref 15–46)
IRON SERPL-MCNC: 96 UG/DL (ref 37–145)
LDLC SERPL CALC-MCNC: 80 MG/DL
MCH RBC QN AUTO: 29.9 PG (ref 26.5–33)
MCHC RBC AUTO-ENTMCNC: 32.9 G/DL (ref 31.5–36.5)
MCV RBC AUTO: 91 FL (ref 78–100)
NONHDLC SERPL-MCNC: 147 MG/DL
PLATELET # BLD AUTO: 223 10E3/UL (ref 150–450)
RBC # BLD AUTO: 4.42 10E6/UL (ref 3.8–5.2)
TRIGL SERPL-MCNC: 334 MG/DL
VIT D+METAB SERPL-MCNC: 27 NG/ML (ref 20–50)
WBC # BLD AUTO: 7.3 10E3/UL (ref 4–11)

## 2024-09-26 PROCEDURE — 83540 ASSAY OF IRON: CPT | Performed by: INTERNAL MEDICINE

## 2024-09-26 PROCEDURE — 85027 COMPLETE CBC AUTOMATED: CPT | Performed by: INTERNAL MEDICINE

## 2024-09-26 PROCEDURE — 99396 PREV VISIT EST AGE 40-64: CPT | Performed by: INTERNAL MEDICINE

## 2024-09-26 PROCEDURE — 82728 ASSAY OF FERRITIN: CPT | Performed by: INTERNAL MEDICINE

## 2024-09-26 PROCEDURE — 80061 LIPID PANEL: CPT | Performed by: INTERNAL MEDICINE

## 2024-09-26 PROCEDURE — 82306 VITAMIN D 25 HYDROXY: CPT | Performed by: INTERNAL MEDICINE

## 2024-09-26 PROCEDURE — 83036 HEMOGLOBIN GLYCOSYLATED A1C: CPT | Performed by: INTERNAL MEDICINE

## 2024-09-26 PROCEDURE — 83550 IRON BINDING TEST: CPT | Performed by: INTERNAL MEDICINE

## 2024-09-26 PROCEDURE — 36415 COLL VENOUS BLD VENIPUNCTURE: CPT | Performed by: INTERNAL MEDICINE

## 2024-09-26 PROCEDURE — 99214 OFFICE O/P EST MOD 30 MIN: CPT | Mod: 25 | Performed by: INTERNAL MEDICINE

## 2024-09-26 NOTE — PROGRESS NOTES
"Preventive Care Visit  [facility], Internal Medicine  Sep 26, 2024      Assessment & Plan     (Z00.00) Routine general medical examination at a health care facility  (primary encounter diagnosis)  Comment: up to date on screening, doing well.       (E55.9) Vitamin D deficiency  Comment: routine lab  Plan: Vitamin D Deficiency            (E61.1) Iron deficiency  Comment: history of deficiency, due for lab  Plan: Ferritin, Iron and iron binding capacity, CBC         with platelets            (Z83.3) Family history of diabetes mellitus  Comment: due for lab  Plan: Hemoglobin A1c            (E78.5) Hyperlipidemia LDL goal <100  Comment: due for lab  Plan: Lipid panel reflex to direct LDL Non-fasting            (R01.1) Newly recognized heart murmur  Comment: systolic murmur not previously documented.  Needs echo.   Plan: Echocardiogram Complete                    BMI  Estimated body mass index is 25.28 kg/m  as calculated from the following:    Height as of this encounter: 1.651 m (5' 5\").    Weight as of this encounter: 68.9 kg (151 lb 14.4 oz).       Counseling  Appropriate preventive services were addressed with this patient via screening, questionnaire, or discussion as appropriate for fall prevention, nutrition, physical activity, Tobacco-use cessation, social engagement, weight loss and cognition.  Checklist reviewing preventive services available has been given to the patient.  Reviewed patient's diet, addressing concerns and/or questions.           Emmanuel Darnell is a 54 year old, presenting for the following:  Physical        9/26/2024     1:45 PM   Additional Questions   Roomed by Leticia De Jesus   Accompanied by N/a        Has been having periods irregularly - LMP 8/19/24, states she has not noticed any menopause sx otherwise       HPI  Carie is here for a preventive health visit.  Overall, she is doing well with the following concerns:   Doing well, taking care of " herself.   Exercising daily. Doing walk/run and alternating weights.   Sleeping well.   Blood pressure - white coat hypertension. In the pharmacy 135-136/80s.  Menopaue - period in march, April, may, June then heavy in August, seems to be irregular              9/25/2024   General Health   How would you rate your overall physical health? Good   Feel stress (tense, anxious, or unable to sleep) Not at all            9/25/2024   Nutrition   Three or more servings of calcium each day? Yes   Diet: Vegetarian/vegan   How many servings of fruit and vegetables per day? (!) 2-3   How many sweetened beverages each day? 0-1            9/25/2024   Exercise   Days per week of moderate/strenous exercise 5 days   Average minutes spent exercising at this level 60 min            9/25/2024   Social Factors   Frequency of gathering with friends or relatives Once a week   Worry food won't last until get money to buy more No   Food not last or not have enough money for food? No   Do you have housing? (Housing is defined as stable permanent housing and does not include staying ouside in a car, in a tent, in an abandoned building, in an overnight shelter, or couch-surfing.) Yes   Are you worried about losing your housing? No   Lack of transportation? No   Unable to get utilities (heat,electricity)? Yes   Want help with housing or utility concern? No      (!) FINANCIAL RESOURCE STRAIN CONCERN      9/25/2024   Fall Risk   Fallen 2 or more times in the past year? No   Trouble with walking or balance? No             9/25/2024   Dental   Dentist two times every year? Yes            9/25/2024   TB Screening   Were you born outside of the [place]? Yes      Today's PHQ-2 Score:       9/25/2024     3:04 PM   PHQ-2 ( 1999 [employer])   Q1: Little interest or pleasure in doing things 0   Q2: Feeling down, depressed or hopeless 0   PHQ-2 Score 0   Q1: Little interest or pleasure in doing things Not at all   Q2: Feeling down, depressed or hopeless Not at all    PHQ-2 Score 0           9/25/2024   Substance Use   Alcohol more than 3/day or more than 7/wk No   Do you use any other substances recreationally? No      Social History     Tobacco Use    Smoking status: Never    Smokeless tobacco: Never   Vaping Use    Vaping status: Never Used   Substance Use Topics    Alcohol use: No    Drug use: No           7/26/2024   LAST FHS-7 RESULTS   1st degree relative breast or ovarian cancer No   Any relative bilateral breast cancer No   Any male have breast cancer No   Any ONE woman have BOTH breast AND ovarian cancer No   Any woman with breast cancer before 50yrs No   2 or more relatives with breast AND/OR ovarian cancer No   2 or more relatives with breast AND/OR bowel cancer No                   9/25/2024   STI Screening   New sexual partner(s) since last STI/HIV test? No      History of abnormal Pap smear: No - age 30- 64 PAP with HPV every 5 years recommended        Latest Ref Rng & Units 9/7/2021     5:45 PM 2/16/2016     9:39 PM 2/16/2016    12:00 AM   PAP / HPV   PAP  Negative for Intraepithelial Lesion or Malignancy (NILM)      PAP (Historical)    NIL    HPV 16 DNA Negative Negative  Negative     HPV 18 DNA Negative Negative  Negative     Other HR HPV Negative Negative  Negative       ASCVD Risk   The 10-year ASCVD risk score (Rylan AGUERO, et al., 2019) is: 3.2%    Values used to calculate the score:      Age: 54 years      Sex: Female      Is Non- : No      Diabetic: No      Tobacco smoker: No      Systolic Blood Pressure: 168 mmHg      Is BP treated: No      HDL Cholesterol: 44 mg/dL      Total Cholesterol: 184 mg/dL           Reviewed and updated as needed this visit by Provider   Tobacco  Allergies  Meds  Problems  Med Hx  Surg Hx  Fam Hx                     Objective    Exam  BP (!) 168/92 (BP Location: Right arm, Patient Position: Sitting, Cuff Size: Adult Regular)   Pulse 94   Temp 97.5  F (36.4  C) (Temporal)   Resp 20   Ht  "1.651 m (5' 5\")   Wt 68.9 kg (151 lb 14.4 oz)   LMP 08/19/2024 (Approximate)   SpO2 99%   BMI 25.28 kg/m     Estimated body mass index is 25.28 kg/m  as calculated from the following:    Height as of this encounter: 1.651 m (5' 5\").    Weight as of this encounter: 68.9 kg (151 lb 14.4 oz).    Physical Exam  GENERAL: alert and no distress  EYES: Eyes grossly normal to inspection, PERRL and conjunctivae and sclerae normal  HENT: ear canals and TM's normal, nose and mouth without ulcers or lesions  NECK: no adenopathy, no asymmetry, masses, or scars  RESP: lungs clear to auscultation - no rales, rhonchi or wheezes  CV: regular rate and rhythm, normal S1 S2, no S3 or S4, 2/6 systolic murmur  MS: no gross musculoskeletal defects noted, no edema  SKIN: no suspicious lesions or rashes  NEURO: Normal strength and tone, mentation intact and speech normal  PSYCH: mentation appears normal, affect normal/bright        Signed Electronically by: Kim Lama MD    "

## 2024-09-26 NOTE — PATIENT INSTRUCTIONS
Please check your blood pressure 4-5 times per week, and send the readings to me after 2 weeks.   Patient Education   Preventive Care Advice   This is general advice given by our system to help you stay healthy. However, your care team may have specific advice just for you. Please talk to your care team about your preventive care needs.  Nutrition  Eat 5 or more servings of fruits and vegetables each day.  Try wheat bread, brown rice and whole grain pasta (instead of white bread, rice, and pasta).  Get enough calcium and vitamin D. Check the label on foods and aim for 100% of the RDA (recommended daily allowance).  Lifestyle  Exercise at least 150 minutes each week  (30 minutes a day, 5 days a week).  Do muscle strengthening activities 2 days a week. These help control your weight and prevent disease.  No smoking.  Wear sunscreen to prevent skin cancer.  Have a dental exam and cleaning every 6 months.  Yearly exams  See your health care team every year to talk about:  Any changes in your health.  Any medicines your care team has prescribed.  Preventive care, family planning, and ways to prevent chronic diseases.  Shots (vaccines)   HPV shots (up to age 26), if you've never had them before.  Hepatitis B shots (up to age 59), if you've never had them before.  COVID-19 shot: Get this shot when it's due.  Flu shot: Get a flu shot every year.  Tetanus shot: Get a tetanus shot every 10 years.  Pneumococcal, hepatitis A, and RSV shots: Ask your care team if you need these based on your risk.  Shingles shot (for age 50 and up)  General health tests  Diabetes screening:  Starting at age 35, Get screened for diabetes at least every 3 years.  If you are younger than age 35, ask your care team if you should be screened for diabetes.  Cholesterol test: At age 39, start having a cholesterol test every 5 years, or more often if advised.  Bone density scan (DEXA): At age 50, ask your care team if you should have this scan for  osteoporosis (brittle bones).  Hepatitis C: Get tested at least once in your life.  STIs (sexually transmitted infections)  Before age 24: Ask your care team if you should be screened for STIs.  After age 24: Get screened for STIs if you're at risk. You are at risk for STIs (including HIV) if:  You are sexually active with more than one person.  You don't use condoms every time.  You or a partner was diagnosed with a sexually transmitted infection.  If you are at risk for HIV, ask about PrEP medicine to prevent HIV.  Get tested for HIV at least once in your life, whether you are at risk for HIV or not.  Cancer screening tests  Cervical cancer screening: If you have a cervix, begin getting regular cervical cancer screening tests starting at age 21.  Breast cancer scan (mammogram): If you've ever had breasts, begin having regular mammograms starting at age 40. This is a scan to check for breast cancer.  Colon cancer screening: It is important to start screening for colon cancer at age 45.  Have a colonoscopy test every 10 years (or more often if you're at risk) Or, ask your provider about stool tests like a FIT test every year or Cologuard test every 3 years.  To learn more about your testing options, visit:   .  For help making a decision, visit:   https://bit.ly/dk35010.  Prostate cancer screening test: If you have a prostate, ask your care team if a prostate cancer screening test (PSA) at age 55 is right for you.  Lung cancer screening: If you are a current or former smoker ages 50 to 80, ask your care team if ongoing lung cancer screenings are right for you.  For informational purposes only. Not to replace the advice of your health care provider. Copyright   2023 [employer]. All rights reserved. Clinically reviewed by the [facility]. Apofore 871660 - REV 01/24.

## 2024-09-30 ENCOUNTER — ALLIED HEALTH/NURSE VISIT (OUTPATIENT)
Dept: PEDIATRICS | Facility: CLINIC | Age: 54
End: 2024-09-30
Payer: COMMERCIAL

## 2024-09-30 VITALS — SYSTOLIC BLOOD PRESSURE: 162 MMHG | DIASTOLIC BLOOD PRESSURE: 94 MMHG

## 2024-09-30 DIAGNOSIS — Z01.30 BP CHECK: Primary | ICD-10-CM

## 2024-09-30 PROCEDURE — 99207 PR NO CHARGE NURSE ONLY: CPT | Performed by: INTERNAL MEDICINE

## 2024-09-30 NOTE — PROGRESS NOTES
Carie Hi was evaluated at [facility] on September 30, 2024 at which time her blood pressure was:    BP Readings from Last 3 Encounters:   09/30/24 (!) 162/94   09/26/24 (!) 168/92   06/13/24 136/78     Pulse Readings from Last 3 Encounters:   09/26/24 94   09/20/23 72   09/13/22 77       Reviewed lifestyle modifications for blood pressure control and reduction: including making healthy food choices, managing weight, getting regular exercise, smoking cessation, reducing alcohol consumption, monitoring blood pressure regularly.     Symptoms: Other: not documented.    BP Goal:< 140/90 mmHg    BP Assessment:  BP too high    Potential Reasons for BP too high: Unknown/Other: likely needed to initiate therapy - ongoing monitoring    BP Follow-Up Plan: Referral to PCP    Recommendation to Provider: Carie was in the pharmacy today for a blood pressure check. Today was still elevated 162/94. Has a few appointments upcoming for BP checks in the pharmacy to continue monitoring ongoing. No changes recommended at this time but likely will need to initiate therapy.    Note completed by: Thank You~  Donnie Gibson, Pharmacist   [facility]

## 2024-10-01 ENCOUNTER — MYC MEDICAL ADVICE (OUTPATIENT)
Dept: PEDIATRICS | Facility: CLINIC | Age: 54
End: 2024-10-01
Payer: COMMERCIAL

## 2024-10-01 ENCOUNTER — ALLIED HEALTH/NURSE VISIT (OUTPATIENT)
Dept: PEDIATRICS | Facility: CLINIC | Age: 54
End: 2024-10-01
Payer: COMMERCIAL

## 2024-10-01 VITALS — SYSTOLIC BLOOD PRESSURE: 152 MMHG | DIASTOLIC BLOOD PRESSURE: 86 MMHG

## 2024-10-01 DIAGNOSIS — Z01.30 BP CHECK: Primary | ICD-10-CM

## 2024-10-01 PROCEDURE — 99207 PR NO CHARGE NURSE ONLY: CPT | Performed by: INTERNAL MEDICINE

## 2024-10-01 NOTE — PROGRESS NOTES
Carie Hi was evaluated at [facility] on October 1, 2024 at which time her blood pressure was:    BP Readings from Last 3 Encounters:   10/01/24 (!) 152/86   09/30/24 (!) 162/94   09/26/24 (!) 168/92     Pulse Readings from Last 3 Encounters:   09/26/24 94   09/20/23 72   09/13/22 77       Reviewed lifestyle modifications for blood pressure control and reduction: including making healthy food choices, managing weight, getting regular exercise, smoking cessation, reducing alcohol consumption, monitoring blood pressure regularly.     Symptoms: None    BP Goal:< 140/90 mmHg    BP Assessment:  BP too high    Potential Reasons for BP too high: Unknown/Other: Need additional therapy?    BP Follow-Up Plan: Recheck BP in 30 days in the pharmacy.Date of next scheduled BP visit or call: Next week actually    Recommendation to Provider: Carie back in pharmacy to check BP - today 152/86 - still high but better than yesterday - Pt will be coming in next week to have checked a few more times. No changes recommended at this time.     Note completed by: Thank You~  Donnie Gibson, Pharmacist   [facility]

## 2024-10-02 NOTE — CONFIDENTIAL NOTE
She can take 2 caps of the fish oil daily.     Either of the iron supplements would be ok. She can take any time of day.     Kim Lama MD   Internal Medicine - Pediatrics

## 2024-10-02 NOTE — TELEPHONE ENCOUNTER
Dr. Lama- patient attached photos of fish oil supplement and liquid iron supplement on [facility].     She would like to know:  What mg of fish oil to take?   How many tabs of fish oil a day and when?  If she should take vegetarian or vegan option of the iron supplement?  When should she take the iron supplement?     Please advise.   Natalia Solares RN

## 2024-10-03 NOTE — TELEPHONE ENCOUNTER
"Dr. Lama- patient would like to know what \"mg\" of fish oil tablets she should take.     Please advise.   Natalia Solares RN   "

## 2024-10-03 NOTE — CONFIDENTIAL NOTE
For lowering triglycerides, a goal in the range of 2-3 g per day is recommended.      Kim Lama MD   Internal Medicine - Pediatrics

## 2024-10-07 ENCOUNTER — ALLIED HEALTH/NURSE VISIT (OUTPATIENT)
Dept: PEDIATRICS | Facility: CLINIC | Age: 54
End: 2024-10-07
Payer: COMMERCIAL

## 2024-10-07 VITALS — DIASTOLIC BLOOD PRESSURE: 80 MMHG | SYSTOLIC BLOOD PRESSURE: 168 MMHG

## 2024-10-07 DIAGNOSIS — Z01.30 BP CHECK: Primary | ICD-10-CM

## 2024-10-07 PROCEDURE — 99207 PR NO CHARGE NURSE ONLY: CPT | Performed by: INTERNAL MEDICINE

## 2024-10-07 NOTE — PROGRESS NOTES
Carie Hi was evaluated at [facility] on October 7, 2024 at which time her blood pressure was:    BP Readings from Last 1 Encounters:   10/07/24 (!) 168/80     No data recorded      Reviewed lifestyle modifications for blood pressure control and reduction: including making healthy food choices, managing weight, getting regular exercise, smoking cessation, reducing alcohol consumption, monitoring blood pressure regularly.     Symptoms: None    BP Goal:< 140/90 mmHg    BP Assessment:  BP too high    Potential Reasons for BP too high: anxiety, lack of medication and stress    BP Follow-Up Plan: Referral to PCP    Recommendation to Provider: pt in pharmacy today for regular BP check. She is monitoring daily either here or at home. She is planning on coming everyday to have BP taken in pharmacy. Denies symptoms and reports getting similar readings at home. Plan to follow up with PCP this week about readings     Note completed by: Ching Shannon RPH

## 2024-10-08 ENCOUNTER — ALLIED HEALTH/NURSE VISIT (OUTPATIENT)
Dept: PEDIATRICS | Facility: CLINIC | Age: 54
End: 2024-10-08
Payer: COMMERCIAL

## 2024-10-08 VITALS — DIASTOLIC BLOOD PRESSURE: 84 MMHG | SYSTOLIC BLOOD PRESSURE: 146 MMHG

## 2024-10-08 DIAGNOSIS — Z01.30 BP CHECK: Primary | ICD-10-CM

## 2024-10-08 PROCEDURE — 99207 PR NO CHARGE NURSE ONLY: CPT | Performed by: INTERNAL MEDICINE

## 2024-10-08 NOTE — PROGRESS NOTES
Carie Hi was evaluated at [facility] on October 8, 2024 at which time her blood pressure was:    BP Readings from Last 1 Encounters:   10/08/24 (!) 146/84     No data recorded      Reviewed lifestyle modifications for blood pressure control and reduction: including making healthy food choices, managing weight, getting regular exercise, smoking cessation, reducing alcohol consumption, monitoring blood pressure regularly.     Symptoms: None    BP Goal:< 140/90 mmHg    BP Assessment:  BP too high    Potential Reasons for BP too high: NA - Not applicable    BP Follow-Up Plan: Referral to PCP    Recommendation to Provider: pt in pharmacy for BP check per plan with PCP. Her readings were 164/84 and 146/84. No symptoms reported. Still getting numbers in the 150s on home BP cuff. She is scheduled to get BP checked every day this week in the pharmacy.     Note completed by: Ching Shannon RPH

## 2024-10-10 ENCOUNTER — ALLIED HEALTH/NURSE VISIT (OUTPATIENT)
Dept: PEDIATRICS | Facility: CLINIC | Age: 54
End: 2024-10-10
Payer: COMMERCIAL

## 2024-10-10 ENCOUNTER — TELEPHONE (OUTPATIENT)
Dept: PEDIATRICS | Facility: CLINIC | Age: 54
End: 2024-10-10
Payer: COMMERCIAL

## 2024-10-10 VITALS — SYSTOLIC BLOOD PRESSURE: 148 MMHG | DIASTOLIC BLOOD PRESSURE: 80 MMHG

## 2024-10-10 DIAGNOSIS — Z01.30 BP CHECK: Primary | ICD-10-CM

## 2024-10-10 PROCEDURE — 99207 PR NO CHARGE NURSE ONLY: CPT | Performed by: INTERNAL MEDICINE

## 2024-10-10 NOTE — TELEPHONE ENCOUNTER
"Called and spoke with Carie. Relayed Dr. Lama's message below.   Patient reports she has been taking her blood pressures at home and planned to send the numbers in [employer] tomorrow.   Patient has questions about possibly starting lisinopril- when should she take it?  What are the side effects?  If this is a life long prescription?     Informed Carie that I would send questions to Dr. Lama but an appointment would be most appropriate to discuss starting lisinopril and any additional recommendations she might have after receiving her home blood pressures.   Patient verbally understands plan and will wait for Dr. Lama's instructions.   Natalia Solares RN       Message from Dr. Lama:  \"Please call Carie.  I've seen the BP checks through the pharmacy, and they all have been high enough that I think we should start a medication to control your blood pressure.  Has she been taking BP at home?     I recommend starting 10 mg lisinopril and watching for the change in your BP. \"  "

## 2024-10-10 NOTE — PROGRESS NOTES
Carie Hi was evaluated at [facility] on October 10, 2024 at which time her blood pressure was:    BP Readings from Last 1 Encounters:   10/09/24 (!) 148/80     No data recorded      Reviewed lifestyle modifications for blood pressure control and reduction: including making healthy food choices, managing weight, getting regular exercise, smoking cessation, reducing alcohol consumption, monitoring blood pressure regularly.     Symptoms: None    BP Goal:< 140/90 mmHg    BP Assessment:  BP too high    Potential Reasons for BP too high: NA - Not applicable    BP Follow-Up Plan: Referral to PCP    Recommendation to Provider: pt in pharmacy again for BP check - took 2 readings 152/84 and 148/80. Continuing with plan to take daily and report to MD. See previous days entries     Note completed by: Ching Shannon RPH

## 2024-10-16 ENCOUNTER — MYC MEDICAL ADVICE (OUTPATIENT)
Dept: PEDIATRICS | Facility: CLINIC | Age: 54
End: 2024-10-16
Payer: COMMERCIAL

## 2024-10-16 DIAGNOSIS — I10 ESSENTIAL HYPERTENSION: Primary | ICD-10-CM

## 2024-10-16 NOTE — TELEPHONE ENCOUNTER
Patient sending update on BP readings. Please review and advise.     Dwayne PINA RN 10/16/2024 at 5:13 PM

## 2024-10-17 RX ORDER — AMLODIPINE BESYLATE 5 MG/1
5 TABLET ORAL DAILY
Qty: 90 TABLET | Refills: 3 | Status: SHIPPED | OUTPATIENT
Start: 2024-10-17

## 2024-10-22 ENCOUNTER — HOSPITAL ENCOUNTER (OUTPATIENT)
Dept: CARDIOLOGY | Facility: CLINIC | Age: 54
Discharge: HOME | End: 2024-10-22
Attending: INTERNAL MEDICINE | Admitting: INTERNAL MEDICINE
Payer: COMMERCIAL

## 2024-10-22 DIAGNOSIS — R01.1 NEWLY RECOGNIZED HEART MURMUR: ICD-10-CM

## 2024-10-22 PROCEDURE — 93306 TTE W/DOPPLER COMPLETE: CPT | Mod: 26 | Performed by: INTERNAL MEDICINE

## 2024-10-22 PROCEDURE — 93306 TTE W/DOPPLER COMPLETE: CPT

## 2024-10-24 ENCOUNTER — MYC MEDICAL ADVICE (OUTPATIENT)
Dept: PEDIATRICS | Facility: CLINIC | Age: 54
End: 2024-10-24
Payer: COMMERCIAL

## 2024-10-24 NOTE — TELEPHONE ENCOUNTER
Sent a [employer] message to the patient   - Informed the patient of Fariba Hernandez PA-C's comments/recommendations     Kaylin ONEILL RN   [facility]

## 2024-10-24 NOTE — TELEPHONE ENCOUNTER
"See patient's [employer] message   - Patient wondering if she can continue her cardio workouts, specifically running   - Patient wondering if she could have a calcium score completed     Upon chart review:  - 10/22/2024 Echocardiogram result note from Dr. Lama states: \"Your Echo results look great!  The result is essentially normal, with trace (a very tiny amount) of regurgitation (blood flowing backwards) at both the mitral and aortic valves. Many people have this, and I don't think it's a cause for concern.\"     Dr. Lama, please review and advise.     Kaylin ONEILL RN   [facility]   "

## 2024-10-24 NOTE — TELEPHONE ENCOUNTER
Yes, she can return to workouts.   There are usually criteria to meet in order to have the calcium score CT completed. She can call her insurance to see if this would be covered.   Nacho Hernandez PA-C

## 2024-12-17 ENCOUNTER — MYC MEDICAL ADVICE (OUTPATIENT)
Dept: PEDIATRICS | Facility: CLINIC | Age: 54
End: 2024-12-17
Payer: COMMERCIAL

## 2025-02-19 ENCOUNTER — ALLIED HEALTH/NURSE VISIT (OUTPATIENT)
Dept: PEDIATRICS | Facility: CLINIC | Age: 55
End: 2025-02-19
Payer: COMMERCIAL

## 2025-02-19 VITALS — SYSTOLIC BLOOD PRESSURE: 122 MMHG | DIASTOLIC BLOOD PRESSURE: 82 MMHG

## 2025-02-19 DIAGNOSIS — Z01.30 BP CHECK: Primary | ICD-10-CM

## 2025-02-19 PROCEDURE — 99207 PR NO CHARGE NURSE ONLY: CPT | Performed by: INTERNAL MEDICINE

## 2025-02-20 NOTE — PROGRESS NOTES
Carie Hi was evaluated at [facility] on February 19, 2025 at which time her blood pressure was:    BP Readings from Last 1 Encounters:   02/19/25 122/82     No data recorded      Reviewed lifestyle modifications for blood pressure control and reduction: including making healthy food choices, managing weight, getting regular exercise, smoking cessation, reducing alcohol consumption, monitoring blood pressure regularly.     Symptoms: None    BP Goal:< 140/90 mmHg    BP Assessment:  BP at goal    Potential Reasons for BP too high: NA - Not applicable    BP Follow-Up Plan: Recheck BP in 6 months at pharmacy    Recommendation to Provider: pt in pharmacy for BP check per [facility] reminder call. Pt states she is taking amlodipine 5mg now - since October about. No side effects reported and states takes every day. She is going to try and take BP at home regularly or stop in at least monthly to check BP. Good today. No concerns     Note completed by: Ching Shannon [facility]

## 2025-03-03 ENCOUNTER — ALLIED HEALTH/NURSE VISIT (OUTPATIENT)
Dept: PEDIATRICS | Facility: CLINIC | Age: 55
End: 2025-03-03

## 2025-03-03 ENCOUNTER — OFFICE VISIT (OUTPATIENT)
Dept: PEDIATRICS | Facility: CLINIC | Age: 55
End: 2025-03-03
Payer: COMMERCIAL

## 2025-03-03 VITALS
SYSTOLIC BLOOD PRESSURE: 156 MMHG | TEMPERATURE: 98 F | WEIGHT: 152.3 LBS | DIASTOLIC BLOOD PRESSURE: 79 MMHG | BODY MASS INDEX: 25.37 KG/M2 | OXYGEN SATURATION: 100 % | HEART RATE: 82 BPM | RESPIRATION RATE: 18 BRPM | HEIGHT: 65 IN

## 2025-03-03 VITALS — DIASTOLIC BLOOD PRESSURE: 78 MMHG | SYSTOLIC BLOOD PRESSURE: 138 MMHG

## 2025-03-03 DIAGNOSIS — Z01.30 BP CHECK: Primary | ICD-10-CM

## 2025-03-03 DIAGNOSIS — N63.23 MASS OF LOWER OUTER QUADRANT OF LEFT BREAST: Primary | ICD-10-CM

## 2025-03-03 PROCEDURE — 3078F DIAST BP <80 MM HG: CPT | Performed by: INTERNAL MEDICINE

## 2025-03-03 PROCEDURE — 3075F SYST BP GE 130 - 139MM HG: CPT | Performed by: INTERNAL MEDICINE

## 2025-03-03 PROCEDURE — 99207 PR NO CHARGE NURSE ONLY: CPT | Performed by: INTERNAL MEDICINE

## 2025-03-03 PROCEDURE — 1126F AMNT PAIN NOTED NONE PRSNT: CPT | Performed by: STUDENT IN AN ORGANIZED HEALTH CARE EDUCATION/TRAINING PROGRAM

## 2025-03-03 PROCEDURE — 3077F SYST BP >= 140 MM HG: CPT | Performed by: STUDENT IN AN ORGANIZED HEALTH CARE EDUCATION/TRAINING PROGRAM

## 2025-03-03 PROCEDURE — 3078F DIAST BP <80 MM HG: CPT | Performed by: STUDENT IN AN ORGANIZED HEALTH CARE EDUCATION/TRAINING PROGRAM

## 2025-03-03 PROCEDURE — 99213 OFFICE O/P EST LOW 20 MIN: CPT | Performed by: STUDENT IN AN ORGANIZED HEALTH CARE EDUCATION/TRAINING PROGRAM

## 2025-03-03 ASSESSMENT — PAIN SCALES - GENERAL: PAINLEVEL_OUTOF10: NO PAIN (0)

## 2025-03-03 NOTE — PROGRESS NOTES
Assessment & Plan     Mass of lower outer quadrant of left breast  Presents today for new mass noted on self breast exam.  Most recent mammogram in July which was within normal limits.  On exam has some heterogeneous breast tissue in the area.  Will refer for diagnostic mammogram and ultrasound   - MA Diagnostic Left w/ Oracio; Future  - US Breast Left Limited 1-3 Quadrants; Future                  Subjective   Carie is a 54 year old, presenting for the following health issues:  Mass  Lump was dissevered the third ir fourth week of last month  Not visible       3/3/2025    10:50 AM   Additional Questions   Roomed by adeel   Accompanied by yeny         3/3/2025    10:50 AM   Patient Reported Additional Medications   Patient reports taking the following new medications na     History of Present Illness       Reason for visit:  Feel a small lump in the lower left breast area  Symptom onset:  1-2 weeks ago  Symptoms include:  No Symptoms  Symptom intensity:  Mild  Symptom progression:  Staying the same  Had these symptoms before:  No  What makes it worse:  None  What makes it better:  None   She is taking medications regularly.      Not visible breast changes  But noted a mass 2-3 weeks ago   No pain   No overlying skin changes   No rash     Does a self breast exam  New from prior exams     Doesn't feel like its changing                      Objective    LMP  (LMP Unknown)   There is no height or weight on file to calculate BMI.  Physical Exam   GENERAL: alert and no distress  BREAST: no palpable axillary masses or adenopathy and mass left breast lower outer quadrant.               Signed Electronically by: Mahogany Evangelista MD

## 2025-03-03 NOTE — PROGRESS NOTES
Carie Hi was evaluated at [facility] on March 3, 2025 at which time her blood pressure was:    BP Readings from Last 3 Encounters:   03/03/25 138/78   03/03/25 (!) 156/79   02/19/25 122/82     Pulse Readings from Last 3 Encounters:   03/03/25 82   09/26/24 94   09/20/23 72       Reviewed lifestyle modifications for blood pressure control and reduction: including making healthy food choices, managing weight, getting regular exercise, smoking cessation, reducing alcohol consumption, monitoring blood pressure regularly.     Symptoms: None    BP Goal:< 140/90 mmHg    BP Assessment:  BP at goal    Potential Reasons for BP too high: Unknown/Other: white coat syndrome? Pt states always higher upstairs vs in pharmacy - we use manual cuff vs automated ones    BP Follow-Up Plan: Recheck BP in 30 days in the pharmacy.Date of next scheduled BP visit or call: 03/21/25    Recommendation to Provider: Carie was in after her visit with PCP - BP was high at visit upstairs but at goal on the manual cuff 138/78 - Currently adherent to amlodipine and normally takes around 1130 am - today took at about 10 am. Wants to continue having checked in pharmacy to ensure stable, on our schedule for another BP check on 3/21/25. Will also start using her home cuff to compare. No changes recommended at this time.    Note completed by: Thank You~  Donnie Gibson, Pharmacist   [facility]

## 2025-03-18 ENCOUNTER — HOSPITAL ENCOUNTER (OUTPATIENT)
Dept: MAMMOGRAPHY | Facility: CLINIC | Age: 55
Discharge: HOME | End: 2025-03-18
Attending: STUDENT IN AN ORGANIZED HEALTH CARE EDUCATION/TRAINING PROGRAM
Payer: COMMERCIAL

## 2025-03-18 ENCOUNTER — HOSPITAL ENCOUNTER (OUTPATIENT)
Dept: ULTRASOUND IMAGING | Facility: CLINIC | Age: 55
Discharge: HOME | End: 2025-03-18
Attending: STUDENT IN AN ORGANIZED HEALTH CARE EDUCATION/TRAINING PROGRAM
Payer: COMMERCIAL

## 2025-03-18 DIAGNOSIS — N63.23 MASS OF LOWER OUTER QUADRANT OF LEFT BREAST: ICD-10-CM

## 2025-03-18 PROCEDURE — 77065 DX MAMMO INCL CAD UNI: CPT | Mod: LT

## 2025-03-18 PROCEDURE — 77061 BREAST TOMOSYNTHESIS UNI: CPT | Mod: LT

## 2025-03-18 PROCEDURE — 76642 ULTRASOUND BREAST LIMITED: CPT | Mod: LT

## (undated) DEVICE — SOL NACL 0.9% INJ 250ML BAG 2B1322Q

## (undated) DEVICE — ENDO TROCAR OPTICAL ACCESS KII Z-THRD 12X100MM C0R85

## (undated) DEVICE — LINEN POUCH DBL 5427

## (undated) DEVICE — SUCTION CANISTER MEDIVAC LINER 3000ML W/LID 65651-530

## (undated) DEVICE — ESU CORD MONOPOLAR 10'  E0510

## (undated) DEVICE — Device

## (undated) DEVICE — ESU GROUND PAD ADULT W/CORD E7507

## (undated) DEVICE — SOL NACL 0.9% IRRIG 3000ML BAG 2B7477

## (undated) DEVICE — SYR 50ML LL W/O NDL 309653

## (undated) DEVICE — CONNECTOR STOPCOCK 3 WAY MALE LL HI-FLO MX9311L

## (undated) DEVICE — CLIP APPLIER ENDO 5MM M/L LIGAMAX EL5ML

## (undated) DEVICE — SU VICRYL 4-0 PS-2 18" UND J496H

## (undated) DEVICE — SUCTION IRR STRYKERFLOW II W/TIP 250-070-520

## (undated) DEVICE — GLOVE PROTEXIS BLUE W/NEU-THERA 8.0  2D73EB80

## (undated) DEVICE — LINEN HALF SHEET 5512

## (undated) DEVICE — RAD RX ISOVUE 300 (50ML) 61% IOPAMIDOL CHARGE PER ML

## (undated) DEVICE — CATH CHOLANGIOGRAM KUMAR CC-019

## (undated) DEVICE — LINEN TOWEL PACK X10 5473

## (undated) DEVICE — SU VICRYL 1 CT-2 27" J335H

## (undated) DEVICE — ENDO TROCAR FIRST ENTRY KII FIOS Z-THRD 05X100MM CTF03

## (undated) DEVICE — DRAPE LEGGINGS 8421

## (undated) DEVICE — GLOVE PROTEXIS W/NEU-THERA 7.5  2D73TE75

## (undated) DEVICE — TUBING IV EXTENSION SET ANESTHESIA 34" MLL 2C6227

## (undated) DEVICE — ENDO POUCH UNIV RETRIEVAL SYSTEM INZII 10MM CD001

## (undated) DEVICE — PREP SKIN SCRUB TRAY 4461A

## (undated) DEVICE — DRAPE C-ARM 60X42" 1013

## (undated) DEVICE — PREP SCRUB SOL EXIDINE 4% CHG 4OZ 29002-404

## (undated) DEVICE — LINEN FULL SHEET 5511

## (undated) DEVICE — BAG CLEAR TRASH 1.3M 39X33" P4040C

## (undated) DEVICE — ENDO TROCAR SLEEVE KII Z-THREADED 05X100MM CTS02

## (undated) DEVICE — SYR 30ML LL W/O NDL 302832

## (undated) DEVICE — DECANTER BAG 2002S

## (undated) RX ORDER — BUPIVACAINE HYDROCHLORIDE AND EPINEPHRINE 2.5; 5 MG/ML; UG/ML
INJECTION, SOLUTION EPIDURAL; INFILTRATION; INTRACAUDAL; PERINEURAL
Status: DISPENSED
Start: 2020-03-31

## (undated) RX ORDER — OXYCODONE HYDROCHLORIDE 5 MG/1
TABLET ORAL
Status: DISPENSED
Start: 2020-03-31

## (undated) RX ORDER — DEXAMETHASONE SODIUM PHOSPHATE 4 MG/ML
INJECTION, SOLUTION INTRA-ARTICULAR; INTRALESIONAL; INTRAMUSCULAR; INTRAVENOUS; SOFT TISSUE
Status: DISPENSED
Start: 2020-03-31

## (undated) RX ORDER — ONDANSETRON 2 MG/ML
INJECTION INTRAMUSCULAR; INTRAVENOUS
Status: DISPENSED
Start: 2020-03-31

## (undated) RX ORDER — PROPOFOL 10 MG/ML
INJECTION, EMULSION INTRAVENOUS
Status: DISPENSED
Start: 2020-03-31

## (undated) RX ORDER — GLYCOPYRROLATE 0.2 MG/ML
INJECTION INTRAMUSCULAR; INTRAVENOUS
Status: DISPENSED
Start: 2020-03-31

## (undated) RX ORDER — EPHEDRINE SULFATE 50 MG/ML
INJECTION, SOLUTION INTRAMUSCULAR; INTRAVENOUS; SUBCUTANEOUS
Status: DISPENSED
Start: 2020-03-31

## (undated) RX ORDER — LIDOCAINE HYDROCHLORIDE 10 MG/ML
INJECTION, SOLUTION EPIDURAL; INFILTRATION; INTRACAUDAL; PERINEURAL
Status: DISPENSED
Start: 2020-03-31

## (undated) RX ORDER — NEOSTIGMINE METHYLSULFATE 1 MG/ML
VIAL (ML) INJECTION
Status: DISPENSED
Start: 2020-03-31

## (undated) RX ORDER — FENTANYL CITRATE 50 UG/ML
INJECTION, SOLUTION INTRAMUSCULAR; INTRAVENOUS
Status: DISPENSED
Start: 2020-03-31